# Patient Record
Sex: FEMALE | Race: BLACK OR AFRICAN AMERICAN
[De-identification: names, ages, dates, MRNs, and addresses within clinical notes are randomized per-mention and may not be internally consistent; named-entity substitution may affect disease eponyms.]

---

## 2020-04-16 ENCOUNTER — APPOINTMENT (OUTPATIENT)
Dept: DISASTER EMERGENCY | Facility: CLINIC | Age: 50
End: 2020-04-16
Payer: COMMERCIAL

## 2020-04-16 VITALS
OXYGEN SATURATION: 96 % | RESPIRATION RATE: 20 BRPM | HEART RATE: 88 BPM | TEMPERATURE: 99.1 F | DIASTOLIC BLOOD PRESSURE: 76 MMHG | SYSTOLIC BLOOD PRESSURE: 122 MMHG

## 2020-04-16 DIAGNOSIS — Z20.828 CONTACT WITH AND (SUSPECTED) EXPOSURE TO OTHER VIRAL COMMUNICABLE DISEASES: ICD-10-CM

## 2020-04-16 PROBLEM — Z00.00 ENCOUNTER FOR PREVENTIVE HEALTH EXAMINATION: Status: ACTIVE | Noted: 2020-04-16

## 2020-04-16 PROCEDURE — 99213 OFFICE O/P EST LOW 20 MIN: CPT

## 2020-04-16 NOTE — HISTORY OF PRESENT ILLNESS
[Patient presents to the office today for COVID-19 evaluation and testing.] : Patient presents to the office today for COVID-19 evaluation and testing. [Patient has been pre-screened by RN at call center for appointment today with our facility.] : Patient has been pre-screened by RN at call center for appointment today with our facility. [] : no dizziness on standing [With Confirmed Case] : patient exposed to a confirmed case of COVID-19 [None] : none [Clear] : clear [Good Air Entry] : good air entry [Normal O2 sat at rest] : normal O2 sat at rest [Grossly normal, interacts, not tired or weak] : grossly normal, interacts, not tired or weak [COVID-19 testing ordered and specimen obtained] : COVID-19 testing ordered and specimen obtained [Discharged with current Quarantine instructions and advised of signs of worsening illness.] : Patient discharged with current quarantine instructions and advised of signs of worsening illness. Patient told to seek emergent care if symptoms occur. 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 She c/o fever (max 100:), cough (dry) sob when she cough Body aches, h/a, sore throat,nausea, loss of taste  S&S started a wk ago\par States was exposed to corona virus\par Pt has been isolated at home since\par taking meds to help symptoms\par PMH: DM [TextBox_[id number]] : pt in NAD, discussed po fluid increase, hot tea, rest, atc Tylenol, pt to quarantine for 14 days. If no fever for 3 days and asymptomatic can d/c self quarantine.\par Pt tested today due to S&S, comorbidities and meeting exposure criteria as per [facility] protocol. Results of test will be informed to pt to provided telephone number, consent signed. If symptoms worsen pt to go to closest ED. C/W Tylenol as needed./ no NSAIDs. C/W current medication regimen. written instructions provided., Verbalized understanding of instructions.

## 2020-04-17 LAB — SARS-COV-2 N GENE NPH QL NAA+PROBE: NOT DETECTED

## 2022-04-13 ENCOUNTER — APPOINTMENT (OUTPATIENT)
Dept: ORTHOPEDIC SURGERY | Facility: CLINIC | Age: 52
End: 2022-04-13

## 2022-05-09 ENCOUNTER — APPOINTMENT (OUTPATIENT)
Dept: ORTHOPEDIC SURGERY | Facility: CLINIC | Age: 52
End: 2022-05-09
Payer: MEDICAID

## 2022-05-09 VITALS — WEIGHT: 208 LBS | BODY MASS INDEX: 35.51 KG/M2 | HEIGHT: 64 IN

## 2022-05-09 DIAGNOSIS — E11.9 TYPE 2 DIABETES MELLITUS W/OUT COMPLICATIONS: ICD-10-CM

## 2022-05-09 PROCEDURE — 73562 X-RAY EXAM OF KNEE 3: CPT | Mod: LT

## 2022-05-09 PROCEDURE — 99072 ADDL SUPL MATRL&STAF TM PHE: CPT

## 2022-05-09 PROCEDURE — 99204 OFFICE O/P NEW MOD 45 MIN: CPT

## 2022-05-09 RX ORDER — CANAGLIFLOZIN 300 MG/1
300 TABLET, FILM COATED ORAL
Refills: 0 | Status: ACTIVE | COMMUNITY

## 2022-05-09 RX ORDER — METFORMIN HYDROCHLORIDE 1000 MG/1
1000 TABLET, COATED ORAL
Refills: 0 | Status: ACTIVE | COMMUNITY

## 2022-05-09 RX ORDER — GLIMEPIRIDE 2 MG/1
2 TABLET ORAL
Refills: 0 | Status: ACTIVE | COMMUNITY

## 2022-05-09 RX ORDER — SEMAGLUTIDE 1.34 MG/ML
4 INJECTION, SOLUTION SUBCUTANEOUS
Refills: 0 | Status: ACTIVE | COMMUNITY

## 2022-05-09 NOTE — PHYSICAL EXAM
[Left] : left knee [FreeTextEntry3] : LT Knee- No laxity Tender medial JT line. No significant lateral JT line tenderness. Nadira's is highly positive medially. No PF tenderness. Antalgic gait

## 2022-05-09 NOTE — HISTORY OF PRESENT ILLNESS
[Left Leg] : left leg [Right Leg] : right leg [9] : 9 [Dull/Aching] : dull/aching [Sharp] : sharp [Intermittent] : intermittent [Nothing helps with pain getting better] : Nothing helps with pain getting better [de-identified] : 05/09/2022: Left leg pain which started over 2 months ago. Pt denies any traumatic injury . Pain in concentrated in her knee and ankle. Pt also states her Rt leg has been bothering her since last week. Burning biting felling on the inner lateral RT Left knee. Pt has had many episode of Rob giving way and has a strong apprehension that the knee will give way, to a degree that causes her to walk with a non reciprocal gait, always leading with the left foot. Nadira's  is highly positive with slight medial torsion producing strong lateral and medial pain.  [] : no

## 2022-06-24 ENCOUNTER — APPOINTMENT (OUTPATIENT)
Dept: ORTHOPEDIC SURGERY | Facility: CLINIC | Age: 52
End: 2022-06-24
Payer: MEDICAID

## 2022-06-24 DIAGNOSIS — S83.242A OTHER TEAR OF MEDIAL MENISCUS, CURRENT INJURY, LEFT KNEE, INITIAL ENCOUNTER: ICD-10-CM

## 2022-06-24 DIAGNOSIS — M17.12 UNILATERAL PRIMARY OSTEOARTHRITIS, LEFT KNEE: ICD-10-CM

## 2022-06-24 PROCEDURE — 99213 OFFICE O/P EST LOW 20 MIN: CPT

## 2022-06-24 RX ORDER — DICLOFENAC SODIUM 1% 10 MG/G
1 GEL TOPICAL
Qty: 3 | Refills: 1 | Status: ACTIVE | COMMUNITY
Start: 2022-06-24 | End: 1900-01-01

## 2022-06-27 PROBLEM — M17.12 PRIMARY OSTEOARTHRITIS OF LEFT KNEE: Status: ACTIVE | Noted: 2022-05-09

## 2022-06-27 PROBLEM — S83.242A TEAR OF MEDIAL MENISCUS OF LEFT KNEE, CURRENT, UNSPECIFIED TEAR TYPE, INITIAL ENCOUNTER: Status: ACTIVE | Noted: 2022-05-09

## 2022-06-27 NOTE — HISTORY OF PRESENT ILLNESS
[Left Leg] : left leg [Dull/Aching] : dull/aching [Sharp] : sharp [Intermittent] : intermittent [Nothing helps with pain getting better] : Nothing helps with pain getting better [Gradual] : gradual [8] : 8 [0] : 0 [Meds] : meds [Walking] : walking [Stairs] : stairs [de-identified] : 05/09/2022: Left leg pain which started over 2 months ago. Pt denies any traumatic injury . Pain in concentrated in her knee and ankle. Pt also states her Rt leg has been bothering her since last week. Burning biting felling on the inner lateral RT Left knee. Pt has had many episode of Rob giving way and has a strong apprehension that the knee will give way, to a degree that causes her to walk with a non reciprocal gait, always leading with the left foot. Nadira's  is highly positive with slight medial torsion producing strong lateral and medial pain.\par \par 06/24/2022: a 53 yo female, presents for left knee pain follow up.the pain has been the same since the last visit. the pain is associated with mild swelling but no clicking/stiffness. uses pain cream that confers a temporally relief. [] : no [FreeTextEntry1] : left knee [FreeTextEntry7] : down to the left ankle [FreeTextEntry9] : pain cream [de-identified] : prolonged standing and walking

## 2022-06-27 NOTE — DISCUSSION/SUMMARY
[de-identified] : Alternatives, Risks, Benefits including pre and post-op procedures were discussed today. \par \par A full informed consent discussion was had today.\par

## 2022-06-27 NOTE — PHYSICAL EXAM
[Left] : left knee [FreeTextEntry3] : LT Knee- No laxity Tender medial JT line. No significant lateral JT line tenderness. Nadira's is highly positive medially. No PF tenderness. Antalgic gait full weight-bearing

## 2024-05-31 ENCOUNTER — APPOINTMENT (OUTPATIENT)
Dept: ORTHOPEDIC SURGERY | Facility: CLINIC | Age: 54
End: 2024-05-31
Payer: COMMERCIAL

## 2024-05-31 VITALS — WEIGHT: 202 LBS | BODY MASS INDEX: 34.49 KG/M2 | HEIGHT: 64 IN

## 2024-05-31 DIAGNOSIS — M17.11 UNILATERAL PRIMARY OSTEOARTHRITIS, RIGHT KNEE: ICD-10-CM

## 2024-05-31 DIAGNOSIS — Z78.9 OTHER SPECIFIED HEALTH STATUS: ICD-10-CM

## 2024-05-31 PROCEDURE — 20610 DRAIN/INJ JOINT/BURSA W/O US: CPT | Mod: RT

## 2024-05-31 PROCEDURE — 73562 X-RAY EXAM OF KNEE 3: CPT | Mod: RT

## 2024-05-31 PROCEDURE — 99214 OFFICE O/P EST MOD 30 MIN: CPT | Mod: 25

## 2024-05-31 PROCEDURE — 73590 X-RAY EXAM OF LOWER LEG: CPT | Mod: RT

## 2024-05-31 RX ORDER — METFORMIN HYDROCHLORIDE 1000 MG/1
1000 TABLET, COATED ORAL
Refills: 0 | Status: ACTIVE | COMMUNITY

## 2024-05-31 RX ORDER — GLIMEPIRIDE 4 MG/1
4 TABLET ORAL
Refills: 0 | Status: ACTIVE | COMMUNITY

## 2024-05-31 RX ORDER — PREGABALIN 75 MG/1
75 CAPSULE ORAL
Refills: 0 | Status: ACTIVE | COMMUNITY

## 2024-05-31 NOTE — IMAGING
[de-identified] :   ----------------------------------------------------------------------------   Right knee exam:   Skin: no significant pertinent finding  Inspection:     (neg) Effusion     (neg) Malalignment     (neg) Swelling     (neg) Quad atrophy     (neg) J-sign  ROM:     0 - 125 degrees of flexion.  Tenderness:     (neg) MJLT     (neg) LJLT     (neg) Medial patellar facet tenderness     (+) Lateral patellar facet tenderness     (+) Crepitus     (+) Patellar grind tenderness     (neg) Patellar tendon     (neg) Quad tendon     Other:  Stability:     (neg) Lachman     (neg) Varus/Valgus instability     (neg) Posterior drawer     (neg) Patellar translation: wnl  Additional tests:     (neg) McMurrays test     (neg) Patellar apprehension     Other:  Strength: 5/5 Q/H/TA/GS/EHL  Neuro: In tact to light touch throughout, DTR's wnl  Vascularity: Extremity warm and well perfused  Gait: normal     [Right] : right knee [There are no fractures, subluxations or dislocations. No significant abnormalities are seen] : There are no fractures, subluxations or dislocations. No significant abnormalities are seen [FreeTextEntry9] : mild to moderate PF OA, moderate medial OA

## 2024-05-31 NOTE — DISCUSSION/SUMMARY
[de-identified] : Plan for R knee CSI today Discussed the importance of maintain glycemic control and watching her blood sugar after the injection Plan for PT ----------------------------------------------------------------------------  Patient was advised that steroid medications may result in increased blood sugars, and given patient's history of diabetes, blood sugars should be monitored closely, where applicable, for 5-10 days during and following steroid medication intake orally or through injection.    All relevant imaging studies pertinent to today's visit, including x-rays, MRI's and/or other advanced imaging studies (CT/etc) were independently interpreted and reviewed with the patient as needed. Implications of the studies together with the patient's clinical picture were discussed to formulate a working diagnosis and management options were detailed.   The patient and/or guardian was advised of the diagnosis.  The natural history of the pathology was explained in full. All questions were answered.  The risks and benefits of conservative and interventional treatment alternatives were explained to the patient  The patient and/or guardian was advised if any advanced diagnostic/imaging study (MRI/CT/etc) is ordered to evaluate potential pathology in the affected area(s), they should follow up in the office to review the results of the study and determine further management that may be indicated.     ----------------------------------------------------------------------------  Large joint corticosteroid injection given: Right knee  Patient indicated for injection after trial of rest, OTC medications including aspirin, Ibuprofen, Aleve etc or prescription NSAIDS, and/or exercises at home and/ or physical therapy without satisfactory response.  Patient has symptoms including pain, swelling, and/or decreased mobility in the joint. The risks, benefits, and alternatives to corticosteroid injection were explained in full to the patient, including but not limited to infection, sepsis, bleeding, scarring, skin discoloration, temporary increase in pain, syncopal episode, failure to resolve symptoms, allergic reaction, symptom recurrence, and elevation of blood sugar in diabetics. Patient understood the risks. All questions were answered. After discussion of options, patient requested an injection.   Oral informed consent was obtained and sterile technique was utilized for the procedure including the preparation of the solutions used for the injection and betadine followed by alcohol prep to the injection site. Anesthesia was given with ethyl chloride sprayed topically. The injection was delivered. Patient tolerated the procedure well.   Post Procedure Instructions: Patient was advised to call if redness, pain, or fever occur and apply ice for 15 min on and 15 min off later today  Medications delivered: Kenalog 10 mg, Lidocaine: 4 cc

## 2024-05-31 NOTE — HISTORY OF PRESENT ILLNESS
[Sudden] : sudden [10] : 10 [5] : 5 [Sharp] : sharp [Frequent] : frequent [Nothing helps with pain getting better] : Nothing helps with pain getting better [Standing] : standing [Walking] : walking [de-identified] : This is Ms. KRISTY MASON  a 54 year old female who comes in today complaining of right knee and shin pain for two months with no injury.  She was seeing Dr Arshad for her L knee and had noted similar pain in the R knee    h/o of diabetes last A1c- 7.9 164 [] : no [de-identified] : [place]

## 2024-07-23 ENCOUNTER — APPOINTMENT (OUTPATIENT)
Dept: ORTHOPEDIC SURGERY | Facility: CLINIC | Age: 54
End: 2024-07-23

## 2024-08-20 ENCOUNTER — APPOINTMENT (OUTPATIENT)
Dept: ORTHOPEDIC SURGERY | Facility: CLINIC | Age: 54
End: 2024-08-20
Payer: COMMERCIAL

## 2024-08-20 DIAGNOSIS — M17.11 UNILATERAL PRIMARY OSTEOARTHRITIS, RIGHT KNEE: ICD-10-CM

## 2024-08-20 PROCEDURE — 99213 OFFICE O/P EST LOW 20 MIN: CPT | Mod: 25

## 2024-08-20 PROCEDURE — 20610 DRAIN/INJ JOINT/BURSA W/O US: CPT | Mod: RT

## 2024-08-20 RX ORDER — DICLOFENAC SODIUM 10 MG/G
1 GEL TOPICAL
Qty: 1 | Refills: 1 | Status: ACTIVE | COMMUNITY
Start: 2024-08-20 | End: 1900-01-01

## 2024-08-20 NOTE — DISCUSSION/SUMMARY
[Medication Risks Reviewed] : Medication risks reviewed [de-identified] : Discussed options, Plan for Pes bursa injection voltaren gel f/u 6-8 weeks ----------------------------------------------------------------------------  Large joint corticosteroid injection given: Pes Bursa  Patient indicated for injection after trial of rest, OTC medications including aspirin, Ibuprofen, Aleve etc or prescription NSAIDS, and/or exercises at home and/ or physical therapy without satisfactory response.  Patient has symptoms including pain, swelling, and/or decreased mobility in the joint. The risks, benefits, and alternatives to corticosteroid injection were explained in full to the patient, including but not limited to infection, sepsis, bleeding, scarring, skin discoloration, temporary increase in pain, syncopal episode, failure to resolve symptoms, allergic reaction, symptom recurrence, and elevation of blood sugar in diabetics. Patient understood the risks. All questions were answered. After discussion of options, patient requested an injection.   Oral informed consent was obtained and sterile technique was utilized for the procedure including the preparation of the solutions used for the injection and betadine followed by alcohol prep to the injection site. Anesthesia was given with ethyl chloride sprayed topically. The injection was delivered. Patient tolerated the procedure well.   Post Procedure Instructions: Patient was advised to call if redness, pain, or fever occur and apply ice for 15 min on and 15 min off later today  Medications delivered: Kenalog 10mg, Lidocaine: 2cc  ----------------------------------------------------------------------------  Patient was advised that steroid medications may result in increased blood sugars, and given patient's history of diabetes, blood sugars should be monitored closely, where applicable, for 5-10 days during and following steroid medication intake orally or through injection.    All relevant imaging studies pertinent to today's visit, including x-rays, MRI's and/or other advanced imaging studies (CT/etc) were independently interpreted and reviewed with the patient as needed. Implications of the studies together with the patient's clinical picture were discussed to formulate a working diagnosis and management options were detailed.   The patient and/or guardian was advised of the diagnosis.  The natural history of the pathology was explained in full. All questions were answered.  The risks and benefits of conservative and interventional treatment alternatives were explained to the patient  The patient and/or guardian was advised if any advanced diagnostic/imaging study (MRI/CT/etc) is ordered to evaluate potential pathology in the affected area(s), they should follow up in the office to review the results of the study and determine further management that may be indicated.

## 2024-08-20 NOTE — HISTORY OF PRESENT ILLNESS
[Sudden] : sudden [10] : 10 [5] : 5 [Sharp] : sharp [Frequent] : frequent [Nothing helps with pain getting better] : Nothing helps with pain getting better [Standing] : standing [Walking] : walking [de-identified] : This is Ms. KRISTY MASON  a 54 year old female who comes in today complaining of right knee and shin pain for two months with no injury.  She was seeing Dr Arshad for her L knee and had noted similar pain in the R knee    h/o of diabetes last A1c- 7.9 164 [] : no [de-identified] : [place]

## 2024-08-20 NOTE — REASON FOR VISIT
[FreeTextEntry2] : f/u - right knee/shin, Injection gave relief for maybe a month, was 100% better, however pain has returned and has worsened, sugars were good after the injection

## 2024-08-20 NOTE — IMAGING
[Right] : right knee [There are no fractures, subluxations or dislocations. No significant abnormalities are seen] : There are no fractures, subluxations or dislocations. No significant abnormalities are seen [de-identified] :   ----------------------------------------------------------------------------   Right knee exam:   Skin: no significant pertinent finding  Inspection:     (neg) Effusion     (neg) Malalignment     (neg) Swelling     (neg) Quad atrophy     (neg) J-sign  ROM:     0 - 125 degrees of flexion.  Tenderness:     (neg) MJLT     (neg) LJLT     (neg) Medial patellar facet tenderness     (+) Lateral patellar facet tenderness     (+) Crepitus     (+) Patellar grind tenderness     (neg) Patellar tendon     (neg) Quad tendon     Other:  Stability:     (neg) Lachman     (neg) Varus/Valgus instability     (neg) Posterior drawer     (neg) Patellar translation: wnl  Additional tests:     (neg) McMurrays test     (neg) Patellar apprehension     Other:  Strength: 5/5 Q/H/TA/GS/EHL  Neuro: In tact to light touch throughout, DTR's wnl  Vascularity: Extremity warm and well perfused  Gait: normal     [FreeTextEntry9] : mild to moderate PF OA, moderate medial OA

## 2024-09-09 ENCOUNTER — APPOINTMENT (OUTPATIENT)
Dept: ORTHOPEDIC SURGERY | Facility: CLINIC | Age: 54
End: 2024-09-09
Payer: MEDICAID

## 2024-09-09 VITALS — HEIGHT: 64 IN | BODY MASS INDEX: 34.49 KG/M2 | WEIGHT: 202 LBS

## 2024-09-09 DIAGNOSIS — M25.561 PAIN IN RIGHT KNEE: ICD-10-CM

## 2024-09-09 DIAGNOSIS — M23.91 UNSPECIFIED INTERNAL DERANGEMENT OF RIGHT KNEE: ICD-10-CM

## 2024-09-09 DIAGNOSIS — M17.11 UNILATERAL PRIMARY OSTEOARTHRITIS, RIGHT KNEE: ICD-10-CM

## 2024-09-09 DIAGNOSIS — M17.12 UNILATERAL PRIMARY OSTEOARTHRITIS, LEFT KNEE: ICD-10-CM

## 2024-09-09 PROCEDURE — 99213 OFFICE O/P EST LOW 20 MIN: CPT | Mod: 25

## 2024-09-09 PROCEDURE — 20610 DRAIN/INJ JOINT/BURSA W/O US: CPT | Mod: RT

## 2024-09-09 PROCEDURE — 73562 X-RAY EXAM OF KNEE 3: CPT | Mod: LT

## 2024-09-09 NOTE — DISCUSSION/SUMMARY
[Medication Risks Reviewed] : Medication risks reviewed [de-identified] : Discussed options, MRI denied, will pursue conservative treatments Start Physical Therapy Plan for b/l knee CSI Plan for asp right knee Discussed MRI if pain symptoms persist f/u 6-8 weeks   ----------------------------------------------------------------------------   Joint/cyst aspiration: right knee   Patient indicated for injection after trial of rest, OTC medications including aspirin, Ibuprofen, Aleve etc or prescription NSAIDS, and/or exercises at home and/ or physical therapy without satisfactory response.  Patient has symptoms including pain, swelling, and/or decreased mobility in the joint. The risks, benefits, and alternatives to aspiration or injection were explained in full to the patient, including but not limited to infection, sepsis, bleeding, scarring, skin discoloration, temporary increase in pain, syncopal episode, failure to resolve symptoms, allergic reaction, symptom recurrence, and elevation of blood sugar in diabetics. Patient understood the risks. All questions were answered. After discussion of options, patient requested an procedure   Oral informed consent was obtained and sterile technique was utilized for the procedure including the preparation of the solutions used for the injection and betadine followed by alcohol prep to the injection site. Anesthesia was given with ethyl chloride sprayed topically. The aspiration was performed. Patient tolerated the procedure well.   Post Procedure Instructions: Patient was advised to call if redness, pain, or fever occur and apply ice for 15 min on and 15 min off later today    ----------------------------------------------------------------------------  Large joint corticosteroid injection given: Bilateral knees  Patient indicated for injection after trial of rest, OTC medications including aspirin, Ibuprofen, Aleve etc or prescription NSAIDS, and/or exercises at home and/ or physical therapy without satisfactory response.  Patient has symptoms including pain, swelling, and/or decreased mobility in the joint. The risks, benefits, and alternatives to corticosteroid injection were explained in full to the patient, including but not limited to infection, sepsis, bleeding, scarring, skin discoloration, temporary increase in pain, syncopal episode, failure to resolve symptoms, allergic reaction, symptom recurrence, and elevation of blood sugar in diabetics. Patient understood the risks. All questions were answered. After discussion of options, patient requested an injection.   Oral informed consent was obtained and sterile technique was utilized for the procedure including the preparation of the solutions used for the injection and betadine followed by alcohol prep to the injection site. Anesthesia was given with ethyl chloride sprayed topically. The injection was delivered. Patient tolerated the procedure well.   Post Procedure Instructions: Patient was advised to call if redness, pain, or fever occur and apply ice for 15 min on and 15 min off later today  Medications delivered: Kenalog 10 mg, Lidocaine: 4 cc in each knee      ----------------------------------------------------------------------------   All relevant imaging studies pertinent to today's visit, including x-rays, MRI's and/or other advanced imaging studies (CT/etc) were independently interpreted and reviewed with the patient as needed. Implications of the studies together with the patient's clinical picture were discussed to formulate a working diagnosis and management options were detailed.   The patient and/or guardian was advised of the diagnosis.  The natural history of the pathology was explained in full. All questions were answered.  The risks and benefits of conservative and interventional treatment alternatives were explained to the patient  The patient and/or guardian was advised if any advanced diagnostic/imaging study (MRI/CT/etc) is ordered to evaluate potential pathology in the affected area(s), they should follow up in the office to review the results of the study and determine further management that may be indicated.

## 2024-09-09 NOTE — HISTORY OF PRESENT ILLNESS
[Sudden] : sudden [10] : 10 [5] : 5 [Sharp] : sharp [Frequent] : frequent [Nothing helps with pain getting better] : Nothing helps with pain getting better [Standing] : standing [Walking] : walking [de-identified] : This is Ms. KRISTY MASON  a 54 year old female who comes in today complaining of right knee and shin pain for two months with no injury.  She was seeing Dr Quevedo for her L knee and had noted similar pain in the R knee. Pt complaining of catching and buckling symptoms    h/o of diabetes last A1c- 7.9 164 [] : no [de-identified] : [place]  [de-identified] : none

## 2024-09-09 NOTE — IMAGING
[There are no fractures, subluxations or dislocations. No significant abnormalities are seen] : There are no fractures, subluxations or dislocations. No significant abnormalities are seen [Bilateral] : knee bilaterally [Moderate tricompartmental OA medial narrowing] : Moderate tricompartmental OA medial narrowing [Mild patellofemoral OA] : Mild patellofemoral OA [de-identified] :   ----------------------------------------------------------------------------   b/l knee exam:   Skin: no significant pertinent finding  Inspection:     (mod) Effusion     (neg) Malalignment     (neg) Swelling     (neg) Quad atrophy     (neg) J-sign  ROM:     0 - 95 degrees of flexion.  Tenderness:     (++) MJLT     (neg) LJLT     (neg) Medial patellar facet tenderness     (+) Lateral patellar facet tenderness     (+) Crepitus     (+) Patellar grind tenderness     (neg) Patellar tendon     (neg) Quad tendon     Other:  Stability:     (neg) Lachman     (neg) Varus/Valgus instability     (neg) Posterior drawer     (neg) Patellar translation: wnl  Additional tests:     (+) McMurrays test     (neg) Patellar apprehension     Other:  Strength: 5/5 Q/H/TA/GS/EHL  Neuro: In tact to light touch throughout, DTR's wnl  Vascularity: Extremity warm and well perfused  Gait: antalgic      [FreeTextEntry9] : mild to moderate PF OA, moderate medial OA

## 2024-09-09 NOTE — REASON FOR VISIT
[FreeTextEntry2] : f/u - right knee, Pt received no relief from pes bursa inj, Pt complaining of catching and buckling symptoms

## 2024-09-20 ENCOUNTER — APPOINTMENT (OUTPATIENT)
Dept: ORTHOPEDIC SURGERY | Facility: CLINIC | Age: 54
End: 2024-09-20

## 2024-10-24 ENCOUNTER — APPOINTMENT (OUTPATIENT)
Dept: ORTHOPEDIC SURGERY | Facility: CLINIC | Age: 54
End: 2024-10-24

## 2024-10-31 ENCOUNTER — APPOINTMENT (OUTPATIENT)
Dept: ORTHOPEDIC SURGERY | Facility: CLINIC | Age: 54
End: 2024-10-31
Payer: MEDICAID

## 2024-10-31 VITALS — HEIGHT: 64 IN | BODY MASS INDEX: 34.49 KG/M2 | WEIGHT: 202 LBS

## 2024-10-31 DIAGNOSIS — M25.561 PAIN IN RIGHT KNEE: ICD-10-CM

## 2024-10-31 DIAGNOSIS — M17.11 UNILATERAL PRIMARY OSTEOARTHRITIS, RIGHT KNEE: ICD-10-CM

## 2024-10-31 PROCEDURE — 99214 OFFICE O/P EST MOD 30 MIN: CPT

## 2024-10-31 RX ORDER — NAPROXEN 500 MG/1
500 TABLET ORAL
Qty: 30 | Refills: 0 | Status: ACTIVE | COMMUNITY
Start: 2024-10-31 | End: 1900-01-01

## 2024-11-15 ENCOUNTER — APPOINTMENT (OUTPATIENT)
Dept: ORTHOPEDIC SURGERY | Facility: CLINIC | Age: 54
End: 2024-11-15
Payer: MEDICAID

## 2024-11-15 VITALS — WEIGHT: 202 LBS | BODY MASS INDEX: 34.49 KG/M2 | HEIGHT: 64 IN

## 2024-11-15 DIAGNOSIS — M23.306 OTHER MENISCUS DERANGEMENTS, UNSPECIFIED MENISCUS, RIGHT KNEE: ICD-10-CM

## 2024-11-15 DIAGNOSIS — M17.11 UNILATERAL PRIMARY OSTEOARTHRITIS, RIGHT KNEE: ICD-10-CM

## 2024-11-15 PROCEDURE — 99214 OFFICE O/P EST MOD 30 MIN: CPT

## 2024-12-10 ENCOUNTER — APPOINTMENT (OUTPATIENT)
Dept: ORTHOPEDIC SURGERY | Facility: CLINIC | Age: 54
End: 2024-12-10
Payer: MEDICAID

## 2024-12-10 DIAGNOSIS — M17.11 UNILATERAL PRIMARY OSTEOARTHRITIS, RIGHT KNEE: ICD-10-CM

## 2024-12-10 PROCEDURE — 99215 OFFICE O/P EST HI 40 MIN: CPT

## 2025-02-11 ENCOUNTER — APPOINTMENT (OUTPATIENT)
Dept: ORTHOPEDIC SURGERY | Facility: CLINIC | Age: 55
End: 2025-02-11
Payer: MEDICAID

## 2025-02-11 DIAGNOSIS — M17.11 UNILATERAL PRIMARY OSTEOARTHRITIS, RIGHT KNEE: ICD-10-CM

## 2025-02-11 PROCEDURE — 99215 OFFICE O/P EST HI 40 MIN: CPT

## 2025-02-14 ENCOUNTER — NON-APPOINTMENT (OUTPATIENT)
Age: 55
End: 2025-02-14

## 2025-03-11 ENCOUNTER — OUTPATIENT (OUTPATIENT)
Dept: OUTPATIENT SERVICES | Facility: HOSPITAL | Age: 55
LOS: 1 days | Discharge: HOME | End: 2025-03-11
Payer: MEDICAID

## 2025-03-11 VITALS
HEIGHT: 64 IN | DIASTOLIC BLOOD PRESSURE: 71 MMHG | OXYGEN SATURATION: 95 % | HEART RATE: 89 BPM | WEIGHT: 194.01 LBS | RESPIRATION RATE: 18 BRPM | TEMPERATURE: 98 F | SYSTOLIC BLOOD PRESSURE: 133 MMHG

## 2025-03-11 DIAGNOSIS — E11.9 TYPE 2 DIABETES MELLITUS WITHOUT COMPLICATIONS: ICD-10-CM

## 2025-03-11 DIAGNOSIS — Z01.818 ENCOUNTER FOR OTHER PREPROCEDURAL EXAMINATION: ICD-10-CM

## 2025-03-11 DIAGNOSIS — M17.11 UNILATERAL PRIMARY OSTEOARTHRITIS, RIGHT KNEE: ICD-10-CM

## 2025-03-11 DIAGNOSIS — Z98.890 OTHER SPECIFIED POSTPROCEDURAL STATES: Chronic | ICD-10-CM

## 2025-03-11 LAB
A1C WITH ESTIMATED AVERAGE GLUCOSE RESULT: 7.8 % — HIGH (ref 4–5.6)
ALBUMIN SERPL ELPH-MCNC: 3.8 G/DL — SIGNIFICANT CHANGE UP (ref 3.3–5)
ALP SERPL-CCNC: 121 U/L — HIGH (ref 40–120)
ALT FLD-CCNC: 30 U/L — SIGNIFICANT CHANGE UP (ref 12–78)
ANION GAP SERPL CALC-SCNC: 8 MMOL/L — SIGNIFICANT CHANGE UP (ref 5–17)
APTT BLD: 31.2 SEC — SIGNIFICANT CHANGE UP (ref 24.5–35.6)
AST SERPL-CCNC: 16 U/L — SIGNIFICANT CHANGE UP (ref 15–37)
BASOPHILS # BLD AUTO: 0.02 K/UL — SIGNIFICANT CHANGE UP (ref 0–0.2)
BASOPHILS NFR BLD AUTO: 0.4 % — SIGNIFICANT CHANGE UP (ref 0–2)
BILIRUB SERPL-MCNC: 0.3 MG/DL — SIGNIFICANT CHANGE UP (ref 0.2–1.2)
BLD GP AB SCN SERPL QL: SIGNIFICANT CHANGE UP
BUN SERPL-MCNC: 18 MG/DL — SIGNIFICANT CHANGE UP (ref 7–23)
CALCIUM SERPL-MCNC: 9.7 MG/DL — SIGNIFICANT CHANGE UP (ref 8.5–10.1)
CHLORIDE SERPL-SCNC: 107 MMOL/L — SIGNIFICANT CHANGE UP (ref 96–108)
CO2 SERPL-SCNC: 24 MMOL/L — SIGNIFICANT CHANGE UP (ref 22–31)
CREAT SERPL-MCNC: 0.57 MG/DL — SIGNIFICANT CHANGE UP (ref 0.5–1.3)
EGFR: 107 ML/MIN/1.73M2 — SIGNIFICANT CHANGE UP
EGFR: 107 ML/MIN/1.73M2 — SIGNIFICANT CHANGE UP
EOSINOPHIL # BLD AUTO: 0.11 K/UL — SIGNIFICANT CHANGE UP (ref 0–0.5)
EOSINOPHIL NFR BLD AUTO: 2.2 % — SIGNIFICANT CHANGE UP (ref 0–6)
ESTIMATED AVERAGE GLUCOSE: 177 MG/DL — HIGH (ref 68–114)
GLUCOSE SERPL-MCNC: 154 MG/DL — HIGH (ref 70–99)
HCT VFR BLD CALC: 40.6 % — SIGNIFICANT CHANGE UP (ref 34.5–45)
HGB BLD-MCNC: 13.2 G/DL — SIGNIFICANT CHANGE UP (ref 11.5–15.5)
IMM GRANULOCYTES NFR BLD AUTO: 0.2 % — SIGNIFICANT CHANGE UP (ref 0–0.9)
INR BLD: 0.93 RATIO — SIGNIFICANT CHANGE UP (ref 0.85–1.16)
LYMPHOCYTES # BLD AUTO: 2.15 K/UL — SIGNIFICANT CHANGE UP (ref 1–3.3)
LYMPHOCYTES # BLD AUTO: 42.5 % — SIGNIFICANT CHANGE UP (ref 13–44)
MCHC RBC-ENTMCNC: 30.3 PG — SIGNIFICANT CHANGE UP (ref 27–34)
MCHC RBC-ENTMCNC: 32.5 G/DL — SIGNIFICANT CHANGE UP (ref 32–36)
MCV RBC AUTO: 93.1 FL — SIGNIFICANT CHANGE UP (ref 80–100)
MONOCYTES # BLD AUTO: 0.36 K/UL — SIGNIFICANT CHANGE UP (ref 0–0.9)
MONOCYTES NFR BLD AUTO: 7.1 % — SIGNIFICANT CHANGE UP (ref 2–14)
MRSA PCR RESULT.: SIGNIFICANT CHANGE UP
NEUTROPHILS # BLD AUTO: 2.41 K/UL — SIGNIFICANT CHANGE UP (ref 1.8–7.4)
NEUTROPHILS NFR BLD AUTO: 47.6 % — SIGNIFICANT CHANGE UP (ref 43–77)
NRBC BLD AUTO-RTO: 0 /100 WBCS — SIGNIFICANT CHANGE UP (ref 0–0)
PLATELET # BLD AUTO: 280 K/UL — SIGNIFICANT CHANGE UP (ref 150–400)
POTASSIUM SERPL-MCNC: 3.9 MMOL/L — SIGNIFICANT CHANGE UP (ref 3.5–5.3)
POTASSIUM SERPL-SCNC: 3.9 MMOL/L — SIGNIFICANT CHANGE UP (ref 3.5–5.3)
PROT SERPL-MCNC: 8 GM/DL — SIGNIFICANT CHANGE UP (ref 6–8.3)
PROTHROM AB SERPL-ACNC: 10.8 SEC — SIGNIFICANT CHANGE UP (ref 9.9–13.4)
RBC # BLD: 4.36 M/UL — SIGNIFICANT CHANGE UP (ref 3.8–5.2)
RBC # FLD: 12.7 % — SIGNIFICANT CHANGE UP (ref 10.3–14.5)
S AUREUS DNA NOSE QL NAA+PROBE: SIGNIFICANT CHANGE UP
SODIUM SERPL-SCNC: 139 MMOL/L — SIGNIFICANT CHANGE UP (ref 135–145)
VIT D25+D1,25 OH+D1,25 PNL SERPL-MCNC: 72.1 PG/ML — SIGNIFICANT CHANGE UP (ref 19.9–79.3)
WBC # BLD: 5.06 K/UL — SIGNIFICANT CHANGE UP (ref 3.8–10.5)
WBC # FLD AUTO: 5.06 K/UL — SIGNIFICANT CHANGE UP (ref 3.8–10.5)

## 2025-03-11 PROCEDURE — 93010 ELECTROCARDIOGRAM REPORT: CPT

## 2025-03-11 NOTE — OCCUPATIONAL THERAPY INITIAL EVALUATION ADULT - PERTINENT HX OF CURRENT PROBLEM, REHAB EVAL
R knee OA which impacts pts ability to perform functional tasks/transfers and mobility. Pt is scheduled for R TKR on 3/28/25.

## 2025-03-11 NOTE — H&P PST ADULT - HISTORY OF PRESENT ILLNESS
54F PMH DM presents to PST for scheduled robotic assisted right total knee arthroplasty with [place] on 3/28/25 with      goal: to walk without pain

## 2025-03-11 NOTE — OCCUPATIONAL THERAPY INITIAL EVALUATION ADULT - LUE MMT, REHAB EVAL
[Normal] : mucosa is normal [Midline] : trachea located in midline position Grossly./no strength deficits were identified

## 2025-03-11 NOTE — H&P PST ADULT - ASSESSMENT
54F PMH DM presents to PST for scheduled robotic assisted right total knee arthroplasty with [place] on 3/28/25 with Dr.Leung CAPRINI SCORE    AGE RELATED RISK FACTORS                                                             [ x] Age 41-60 years                                            (1 Point)  [ ] Age: 61-74 years                                           (2 Points)                 [ ] Age= 75 years                                                (3 Points)             DISEASE RELATED RISK FACTORS                                                       [ ] Edema in the lower extremities                 (1 Point)                     [ ] Varicose veins                                               (1 Point)                                 [x ] BMI > 25 Kg/m2                                            (1 Point)                                  [ ] Serious infection (ie PNA)                            (1 Point)                     [ ] Lung disease ( COPD, Emphysema)            (1 Point)                                                                          [ ] Acute myocardial infarction                         (1 Point)                  [ ] Congestive heart failure (in the previous month)  (1 Point)         [ ] Inflammatory bowel disease                            (1 Point)                  [ ] Central venous access, PICC or Port               (2 points)       (within the last month)                                                                [ ] Stroke (in the previous month)                        (5 Points)    [ ] Previous or present malignancy                       (2 points)                                                                                                                                                         HEMATOLOGY RELATED FACTORS                                                         [ ] Prior episodes of VTE                                     (3 Points)                     [ ] Positive family history for VTE                      (3 Points)                  [ ] Prothrombin 00169 A                                     (3 Points)                     [ ] Factor V Leiden                                                (3 Points)                        [ ] Lupus anticoagulants                                      (3 Points)                                                           [ ] Anticardiolipin antibodies                              (3 Points)                                                       [ ] High homocysteine in the blood                   (3 Points)                                             [ ] Other congenital or acquired thrombophilia      (3 Points)                                                [ ] Heparin induced thrombocytopenia                  (3 Points)                                        MOBILITY RELATED FACTORS  [ ] Bed rest                                                         (1 Point)  [ ] Plaster cast                                                    (2 points)  [ ] Bed bound for more than 72 hours           (2 Points)    GENDER SPECIFIC FACTORS  [ ] Pregnancy or had a baby within the last month   (1 Point)  [ ] Post-partum < 6 weeks                                   (1 Point)  [ ] Hormonal therapy  or oral contraception   (1 Point)  [ ] History of pregnancy complications              (1 point)  [ ] Unexplained or recurrent              (1 Point)    OTHER RISK FACTORS                                           (1 Point)  [ ] BMI >40, smoking, diabetes requiring insulin, chemotherapy  blood transfusions and length of surgery over 2 hours    SURGERY RELATED RISK FACTORS  [ ]  Section within the last month     (1 Point)  [ ] Minor surgery                                                  (1 Point)  [ ] Arthroscopic surgery                                       (2 Points)  [ ] Planned major surgery lasting more            (2 Points)      than 45 minutes     [x ] Elective hip or knee joint replacement       (5 points)       surgery                                                TRAUMA RELATED RISK FACTORS  [ ] Fracture of the hip, pelvis, or leg                       (5 Points)  [ ] Spinal cord injury resulting in paralysis             (5 points)       (in the previous month)    [ ] Paralysis  (less than 1 month)                             (5 Points)  [ ] Multiple Trauma within 1 month                        (5 Points)    Total Score [   7     ]    Caprini Score 0-2: Low Risk, NO VTE prophylaxis required for most patients, encourage ambulation  Caprini Score 3-6: Moderate Risk , pharmacologic VTE prophylaxis is indicated for most patients (in the absence of contraindications)  Caprini Score Greater than or =7: High risk, pharmocologic VTE prophylaxis indicated for most patients (in the absence of contraindications)

## 2025-03-11 NOTE — H&P PST ADULT - PROBLEM SELECTOR PLAN 1
Labs-CBC, BMP, PT/INR, PTT ,T&S,HgA1C, Nose Cx, EKG   M/C required    Preop Hibiclens x 3 day instructions reviewed and given. Instructed on if Cx is positive use Mupirocin 5 days and checklist given in booklet   Take routine meds DOS with small sips of water, avoid NSAIDs and OTC supplements, verbalized understanding information on proper nutrition, increase protein and better food choices provided in booklet.    Ensure clear not given 2/2 hx DM  Anesthesiologist to review PST labs, EKG, required clearances, and optimization for surgery

## 2025-03-11 NOTE — H&P PST ADULT - NSICDXPASTSURGICALHX_GEN_ALL_CORE_FT
PAST SURGICAL HISTORY:  No significant past surgical history      PAST SURGICAL HISTORY:  History of cone biopsy of uterine cervix

## 2025-03-11 NOTE — OCCUPATIONAL THERAPY INITIAL EVALUATION ADULT - ADDITIONAL COMMENTS
Pt lives with daughter and her family (Who can assist post op) in a private house with 3 steps with no rails. Once inside, the pt has 12 steps with a L handrail down to where the bedroom and bathroom is. The pts bathroom has a walk in shower stall, fixed/retractable shower head, standard toilet seat and no grab bars. The pt ambulates with no device and owns a pair of axillary crutches. The pt daily pain is a 5/10 at rest and a 8-9/10 with movement. The pt manages the pain with rest, Tylenol and Gabapentin. The pt recently had outpatient PT, no recent falls and has no buckling of the knees. The pt wears glasses all the time, R handed, drives and has no hearing impairments.

## 2025-03-14 ENCOUNTER — NON-APPOINTMENT (OUTPATIENT)
Age: 55
End: 2025-03-14

## 2025-03-27 RX ORDER — MELATONIN 5 MG
3 TABLET ORAL AT BEDTIME
Refills: 0 | Status: DISCONTINUED | OUTPATIENT
Start: 2025-03-28 | End: 2025-03-30

## 2025-03-27 RX ORDER — INSULIN LISPRO 100 U/ML
INJECTION, SOLUTION INTRAVENOUS; SUBCUTANEOUS AT BEDTIME
Refills: 0 | Status: DISCONTINUED | OUTPATIENT
Start: 2025-03-28 | End: 2025-03-30

## 2025-03-27 RX ORDER — SODIUM CHLORIDE 9 G/1000ML
1000 INJECTION, SOLUTION INTRAVENOUS
Refills: 0 | Status: DISCONTINUED | OUTPATIENT
Start: 2025-03-28 | End: 2025-03-30

## 2025-03-27 RX ORDER — ASPIRIN 325 MG
81 TABLET ORAL
Refills: 0 | Status: DISCONTINUED | OUTPATIENT
Start: 2025-03-29 | End: 2025-03-30

## 2025-03-27 RX ORDER — CELECOXIB 50 MG/1
200 CAPSULE ORAL EVERY 12 HOURS
Refills: 0 | Status: DISCONTINUED | OUTPATIENT
Start: 2025-03-29 | End: 2025-03-30

## 2025-03-27 RX ORDER — GLUCAGON 3 MG/1
1 POWDER NASAL ONCE
Refills: 0 | Status: DISCONTINUED | OUTPATIENT
Start: 2025-03-28 | End: 2025-03-30

## 2025-03-27 RX ORDER — B1/B2/B3/B5/B6/B12/VIT C/FOLIC 500-0.5 MG
1 TABLET ORAL DAILY
Refills: 0 | Status: DISCONTINUED | OUTPATIENT
Start: 2025-03-28 | End: 2025-03-30

## 2025-03-27 RX ORDER — ONDANSETRON HCL/PF 4 MG/2 ML
4 VIAL (ML) INJECTION EVERY 6 HOURS
Refills: 0 | Status: DISCONTINUED | OUTPATIENT
Start: 2025-03-28 | End: 2025-03-30

## 2025-03-27 RX ORDER — ACETAMINOPHEN 500 MG/5ML
1000 LIQUID (ML) ORAL EVERY 8 HOURS
Refills: 0 | Status: DISCONTINUED | OUTPATIENT
Start: 2025-03-28 | End: 2025-03-30

## 2025-03-27 RX ORDER — POLYETHYLENE GLYCOL 3350 17 G/17G
17 POWDER, FOR SOLUTION ORAL AT BEDTIME
Refills: 0 | Status: DISCONTINUED | OUTPATIENT
Start: 2025-03-28 | End: 2025-03-30

## 2025-03-27 RX ORDER — DEXTROSE 50 % IN WATER 50 %
25 SYRINGE (ML) INTRAVENOUS ONCE
Refills: 0 | Status: DISCONTINUED | OUTPATIENT
Start: 2025-03-28 | End: 2025-03-30

## 2025-03-27 RX ORDER — DEXTROSE 50 % IN WATER 50 %
12.5 SYRINGE (ML) INTRAVENOUS ONCE
Refills: 0 | Status: DISCONTINUED | OUTPATIENT
Start: 2025-03-28 | End: 2025-03-30

## 2025-03-27 RX ORDER — OXYCODONE HYDROCHLORIDE 30 MG/1
10 TABLET ORAL
Refills: 0 | Status: DISCONTINUED | OUTPATIENT
Start: 2025-03-28 | End: 2025-03-30

## 2025-03-27 RX ORDER — INSULIN LISPRO 100 U/ML
INJECTION, SOLUTION INTRAVENOUS; SUBCUTANEOUS
Refills: 0 | Status: DISCONTINUED | OUTPATIENT
Start: 2025-03-28 | End: 2025-03-30

## 2025-03-27 RX ORDER — ACETAMINOPHEN 500 MG/5ML
1000 LIQUID (ML) ORAL ONCE
Refills: 0 | Status: DISCONTINUED | OUTPATIENT
Start: 2025-03-28 | End: 2025-03-30

## 2025-03-27 RX ORDER — OXYCODONE HYDROCHLORIDE 30 MG/1
5 TABLET ORAL
Refills: 0 | Status: DISCONTINUED | OUTPATIENT
Start: 2025-03-28 | End: 2025-03-30

## 2025-03-27 RX ORDER — GABAPENTIN 400 MG/1
300 CAPSULE ORAL EVERY 12 HOURS
Refills: 0 | Status: DISCONTINUED | OUTPATIENT
Start: 2025-03-28 | End: 2025-03-30

## 2025-03-27 RX ORDER — BISACODYL 5 MG
10 TABLET, DELAYED RELEASE (ENTERIC COATED) ORAL ONCE
Refills: 0 | Status: DISCONTINUED | OUTPATIENT
Start: 2025-03-30 | End: 2025-03-30

## 2025-03-27 RX ORDER — MAGNESIUM HYDROXIDE 400 MG/5ML
30 SUSPENSION ORAL DAILY
Refills: 0 | Status: DISCONTINUED | OUTPATIENT
Start: 2025-03-28 | End: 2025-03-30

## 2025-03-27 RX ORDER — SENNA 187 MG
2 TABLET ORAL AT BEDTIME
Refills: 0 | Status: DISCONTINUED | OUTPATIENT
Start: 2025-03-28 | End: 2025-03-30

## 2025-03-27 RX ORDER — HYDROMORPHONE/SOD CHLOR,ISO/PF 2 MG/10 ML
0.5 SYRINGE (ML) INJECTION
Refills: 0 | Status: DISCONTINUED | OUTPATIENT
Start: 2025-03-28 | End: 2025-03-30

## 2025-03-27 RX ORDER — DEXTROSE 50 % IN WATER 50 %
15 SYRINGE (ML) INTRAVENOUS ONCE
Refills: 0 | Status: DISCONTINUED | OUTPATIENT
Start: 2025-03-28 | End: 2025-03-30

## 2025-03-28 ENCOUNTER — TRANSCRIPTION ENCOUNTER (OUTPATIENT)
Age: 55
End: 2025-03-28

## 2025-03-28 ENCOUNTER — INPATIENT (INPATIENT)
Facility: HOSPITAL | Age: 55
LOS: 1 days | Discharge: HOME HEALTH | End: 2025-03-30
Attending: ORTHOPAEDIC SURGERY | Admitting: ORTHOPAEDIC SURGERY
Payer: MEDICAID

## 2025-03-28 ENCOUNTER — APPOINTMENT (OUTPATIENT)
Dept: ORTHOPEDIC SURGERY | Facility: HOSPITAL | Age: 55
End: 2025-03-28
Payer: MEDICAID

## 2025-03-28 VITALS
RESPIRATION RATE: 16 BRPM | HEART RATE: 91 BPM | HEIGHT: 64 IN | OXYGEN SATURATION: 100 % | SYSTOLIC BLOOD PRESSURE: 124 MMHG | WEIGHT: 194.01 LBS | DIASTOLIC BLOOD PRESSURE: 78 MMHG | TEMPERATURE: 98 F

## 2025-03-28 DIAGNOSIS — Z98.890 OTHER SPECIFIED POSTPROCEDURAL STATES: Chronic | ICD-10-CM

## 2025-03-28 LAB
ANION GAP SERPL CALC-SCNC: 5 MMOL/L — SIGNIFICANT CHANGE UP (ref 5–17)
BUN SERPL-MCNC: 18 MG/DL — SIGNIFICANT CHANGE UP (ref 7–23)
CALCIUM SERPL-MCNC: 9 MG/DL — SIGNIFICANT CHANGE UP (ref 8.5–10.1)
CHLORIDE SERPL-SCNC: 109 MMOL/L — HIGH (ref 96–108)
CO2 SERPL-SCNC: 28 MMOL/L — SIGNIFICANT CHANGE UP (ref 22–31)
CREAT SERPL-MCNC: 0.62 MG/DL — SIGNIFICANT CHANGE UP (ref 0.5–1.3)
EGFR: 104 ML/MIN/1.73M2 — SIGNIFICANT CHANGE UP
EGFR: 104 ML/MIN/1.73M2 — SIGNIFICANT CHANGE UP
GLUCOSE BLDC GLUCOMTR-MCNC: 154 MG/DL — HIGH (ref 70–99)
GLUCOSE BLDC GLUCOMTR-MCNC: 164 MG/DL — HIGH (ref 70–99)
GLUCOSE BLDC GLUCOMTR-MCNC: 168 MG/DL — HIGH (ref 70–99)
GLUCOSE BLDC GLUCOMTR-MCNC: 181 MG/DL — HIGH (ref 70–99)
GLUCOSE BLDC GLUCOMTR-MCNC: 236 MG/DL — HIGH (ref 70–99)
GLUCOSE SERPL-MCNC: 177 MG/DL — HIGH (ref 70–99)
HCT VFR BLD CALC: 37.5 % — SIGNIFICANT CHANGE UP (ref 34.5–45)
HGB BLD-MCNC: 12.1 G/DL — SIGNIFICANT CHANGE UP (ref 11.5–15.5)
MCHC RBC-ENTMCNC: 30.2 PG — SIGNIFICANT CHANGE UP (ref 27–34)
MCHC RBC-ENTMCNC: 32.3 G/DL — SIGNIFICANT CHANGE UP (ref 32–36)
MCV RBC AUTO: 93.5 FL — SIGNIFICANT CHANGE UP (ref 80–100)
NRBC BLD AUTO-RTO: 0 /100 WBCS — SIGNIFICANT CHANGE UP (ref 0–0)
PLATELET # BLD AUTO: 216 K/UL — SIGNIFICANT CHANGE UP (ref 150–400)
POTASSIUM SERPL-MCNC: 3.9 MMOL/L — SIGNIFICANT CHANGE UP (ref 3.5–5.3)
POTASSIUM SERPL-SCNC: 3.9 MMOL/L — SIGNIFICANT CHANGE UP (ref 3.5–5.3)
RBC # BLD: 4.01 M/UL — SIGNIFICANT CHANGE UP (ref 3.8–5.2)
RBC # FLD: 12.9 % — SIGNIFICANT CHANGE UP (ref 10.3–14.5)
SODIUM SERPL-SCNC: 142 MMOL/L — SIGNIFICANT CHANGE UP (ref 135–145)
WBC # BLD: 5.2 K/UL — SIGNIFICANT CHANGE UP (ref 3.8–10.5)
WBC # FLD AUTO: 5.2 K/UL — SIGNIFICANT CHANGE UP (ref 3.8–10.5)

## 2025-03-28 PROCEDURE — 20985 CPTR-ASST DIR MS PX: CPT

## 2025-03-28 PROCEDURE — 73560 X-RAY EXAM OF KNEE 1 OR 2: CPT | Mod: 26,RT

## 2025-03-28 PROCEDURE — 27447 TOTAL KNEE ARTHROPLASTY: CPT | Mod: AS,RT

## 2025-03-28 PROCEDURE — 27447 TOTAL KNEE ARTHROPLASTY: CPT | Mod: RT

## 2025-03-28 DEVICE — INSERT TIB BEARING TRIATHLON CS X3 SZ 3 9MM: Type: IMPLANTABLE DEVICE | Site: RIGHT | Status: FUNCTIONAL

## 2025-03-28 DEVICE — COMP FEM CR CMNTLSS BEADED W/ PA SZ 2 RT: Type: IMPLANTABLE DEVICE | Site: RIGHT | Status: FUNCTIONAL

## 2025-03-28 DEVICE — TRIATHLON TIBIAL COMP SZ 3: Type: IMPLANTABLE DEVICE | Site: RIGHT | Status: FUNCTIONAL

## 2025-03-28 DEVICE — MAKO BONE PIN 3.2MM X 140MM: Type: IMPLANTABLE DEVICE | Site: RIGHT | Status: FUNCTIONAL

## 2025-03-28 DEVICE — MAKO BONE PIN 3.2MM X 110MM: Type: IMPLANTABLE DEVICE | Site: RIGHT | Status: FUNCTIONAL

## 2025-03-28 DEVICE — PATELLA ASYMM TRIATHLON SZ A 32X10MM: Type: IMPLANTABLE DEVICE | Site: RIGHT | Status: FUNCTIONAL

## 2025-03-28 RX ORDER — B1/B2/B3/B5/B6/B12/VIT C/FOLIC 500-0.5 MG
1 TABLET ORAL
Qty: 0 | Refills: 0 | DISCHARGE
Start: 2025-03-28

## 2025-03-28 RX ORDER — SENNA 187 MG
2 TABLET ORAL
Qty: 0 | Refills: 0 | DISCHARGE
Start: 2025-03-28

## 2025-03-28 RX ORDER — ASPIRIN 325 MG
1 TABLET ORAL
Qty: 60 | Refills: 0
Start: 2025-03-28 | End: 2025-04-26

## 2025-03-28 RX ORDER — ONDANSETRON HCL/PF 4 MG/2 ML
1 VIAL (ML) INJECTION
Qty: 28 | Refills: 0
Start: 2025-03-28 | End: 2025-04-03

## 2025-03-28 RX ORDER — CELECOXIB 50 MG/1
200 CAPSULE ORAL ONCE
Refills: 0 | Status: COMPLETED | OUTPATIENT
Start: 2025-03-28 | End: 2025-03-28

## 2025-03-28 RX ORDER — ONDANSETRON HCL/PF 4 MG/2 ML
4 VIAL (ML) INJECTION ONCE
Refills: 0 | Status: DISCONTINUED | OUTPATIENT
Start: 2025-03-28 | End: 2025-03-28

## 2025-03-28 RX ORDER — OXYCODONE HYDROCHLORIDE 30 MG/1
1 TABLET ORAL
Qty: 42 | Refills: 0
Start: 2025-03-28 | End: 2025-04-03

## 2025-03-28 RX ORDER — CEFAZOLIN SODIUM IN 0.9 % NACL 3 G/100 ML
2000 INTRAVENOUS SOLUTION, PIGGYBACK (ML) INTRAVENOUS EVERY 8 HOURS
Refills: 0 | Status: COMPLETED | OUTPATIENT
Start: 2025-03-28 | End: 2025-03-28

## 2025-03-28 RX ORDER — ACETAMINOPHEN 500 MG/5ML
650 LIQUID (ML) ORAL ONCE
Refills: 0 | Status: DISCONTINUED | OUTPATIENT
Start: 2025-03-28 | End: 2025-03-28

## 2025-03-28 RX ORDER — ACETAMINOPHEN 500 MG/5ML
2 LIQUID (ML) ORAL
Qty: 0 | Refills: 0 | DISCHARGE
Start: 2025-03-28

## 2025-03-28 RX ORDER — CELECOXIB 50 MG/1
1 CAPSULE ORAL
Qty: 60 | Refills: 0
Start: 2025-03-28 | End: 2025-04-26

## 2025-03-28 RX ORDER — NALOXONE HYDROCHLORIDE 0.4 MG/ML
4 INJECTION, SOLUTION INTRAMUSCULAR; INTRAVENOUS; SUBCUTANEOUS
Qty: 1 | Refills: 0
Start: 2025-03-28 | End: 2025-03-28

## 2025-03-28 RX ORDER — GABAPENTIN 400 MG/1
1 CAPSULE ORAL
Qty: 0 | Refills: 0 | DISCHARGE
Start: 2025-03-28

## 2025-03-28 RX ORDER — GABAPENTIN 400 MG/1
0 CAPSULE ORAL
Refills: 0 | DISCHARGE

## 2025-03-28 RX ORDER — CYCLOBENZAPRINE HYDROCHLORIDE 15 MG/1
1 CAPSULE, EXTENDED RELEASE ORAL
Qty: 9 | Refills: 0
Start: 2025-03-28 | End: 2025-03-30

## 2025-03-28 RX ORDER — KETOROLAC TROMETHAMINE 30 MG/ML
15 INJECTION, SOLUTION INTRAMUSCULAR; INTRAVENOUS EVERY 6 HOURS
Refills: 0 | Status: DISCONTINUED | OUTPATIENT
Start: 2025-03-28 | End: 2025-03-29

## 2025-03-28 RX ORDER — HYDROMORPHONE/SOD CHLOR,ISO/PF 2 MG/10 ML
0.5 SYRINGE (ML) INJECTION
Refills: 0 | Status: DISCONTINUED | OUTPATIENT
Start: 2025-03-28 | End: 2025-03-28

## 2025-03-28 RX ORDER — DEXAMETHASONE 0.5 MG/1
8 TABLET ORAL ONCE
Refills: 0 | Status: COMPLETED | OUTPATIENT
Start: 2025-03-29 | End: 2025-03-29

## 2025-03-28 RX ORDER — SODIUM CHLORIDE 9 G/1000ML
1000 INJECTION, SOLUTION INTRAVENOUS
Refills: 0 | Status: DISCONTINUED | OUTPATIENT
Start: 2025-03-28 | End: 2025-03-29

## 2025-03-28 RX ORDER — FENTANYL CITRATE-0.9 % NACL/PF 100MCG/2ML
25 SYRINGE (ML) INTRAVENOUS
Refills: 0 | Status: DISCONTINUED | OUTPATIENT
Start: 2025-03-28 | End: 2025-03-30

## 2025-03-28 RX ORDER — SODIUM CHLORIDE 9 G/1000ML
1000 INJECTION, SOLUTION INTRAVENOUS
Refills: 0 | Status: DISCONTINUED | OUTPATIENT
Start: 2025-03-28 | End: 2025-03-28

## 2025-03-28 RX ADMIN — SODIUM CHLORIDE 125 MILLILITER(S): 9 INJECTION, SOLUTION INTRAVENOUS at 12:58

## 2025-03-28 RX ADMIN — KETOROLAC TROMETHAMINE 15 MILLIGRAM(S): 30 INJECTION, SOLUTION INTRAMUSCULAR; INTRAVENOUS at 13:49

## 2025-03-28 RX ADMIN — Medication 500 MILLIGRAM(S): at 17:17

## 2025-03-28 RX ADMIN — GABAPENTIN 300 MILLIGRAM(S): 400 CAPSULE ORAL at 17:18

## 2025-03-28 RX ADMIN — INSULIN LISPRO 1: 100 INJECTION, SOLUTION INTRAVENOUS; SUBCUTANEOUS at 16:14

## 2025-03-28 RX ADMIN — Medication 2 TABLET(S): at 21:26

## 2025-03-28 RX ADMIN — KETOROLAC TROMETHAMINE 15 MILLIGRAM(S): 30 INJECTION, SOLUTION INTRAMUSCULAR; INTRAVENOUS at 18:03

## 2025-03-28 RX ADMIN — CELECOXIB 200 MILLIGRAM(S): 50 CAPSULE ORAL at 06:30

## 2025-03-28 RX ADMIN — INSULIN LISPRO 1: 100 INJECTION, SOLUTION INTRAVENOUS; SUBCUTANEOUS at 12:49

## 2025-03-28 RX ADMIN — KETOROLAC TROMETHAMINE 15 MILLIGRAM(S): 30 INJECTION, SOLUTION INTRAMUSCULAR; INTRAVENOUS at 17:17

## 2025-03-28 RX ADMIN — POLYETHYLENE GLYCOL 3350 17 GRAM(S): 17 POWDER, FOR SOLUTION ORAL at 21:26

## 2025-03-28 RX ADMIN — Medication 100 MILLIGRAM(S): at 16:20

## 2025-03-28 RX ADMIN — OXYCODONE HYDROCHLORIDE 10 MILLIGRAM(S): 30 TABLET ORAL at 18:03

## 2025-03-28 RX ADMIN — SODIUM CHLORIDE 125 MILLILITER(S): 9 INJECTION, SOLUTION INTRAVENOUS at 21:26

## 2025-03-28 RX ADMIN — OXYCODONE HYDROCHLORIDE 10 MILLIGRAM(S): 30 TABLET ORAL at 17:17

## 2025-03-28 RX ADMIN — KETOROLAC TROMETHAMINE 15 MILLIGRAM(S): 30 INJECTION, SOLUTION INTRAMUSCULAR; INTRAVENOUS at 12:51

## 2025-03-28 RX ADMIN — SODIUM CHLORIDE 100 MILLILITER(S): 9 INJECTION, SOLUTION INTRAVENOUS at 09:45

## 2025-03-28 RX ADMIN — Medication 1 TABLET(S): at 12:52

## 2025-03-28 NOTE — PROGRESS NOTE ADULT - SUBJECTIVE AND OBJECTIVE BOX
Post op Note  Patient is s/p R TKA under spinal anesthesia. Pt tolerated procedure well without any intra-op complications. Pt doing well at this time.  Denies CP/SOB/Dizziness/N/V/D/HA. Pain is controlled.     Vital Signs Last 24 Hrs  T(C): 36.5 (28 Mar 2025 13:40), Max: 36.9 (28 Mar 2025 06:07)  T(F): 97.7 (28 Mar 2025 13:40), Max: 98.5 (28 Mar 2025 06:07)  HR: 91 (28 Mar 2025 13:40) (75 - 91)  BP: 125/71 (28 Mar 2025 13:40) (102/67 - 125/71)  BP(mean): --  RR: 16 (28 Mar 2025 13:40) (10 - 18)  SpO2: 97% (28 Mar 2025 13:40) (96% - 100%)    Parameters below as of 28 Mar 2025 13:40  Patient On (Oxygen Delivery Method): room air        GEN: NAD  RLE: Dressing C/D/I.   B/L LE's: Motor intact + EHL/FHL/TA/GS in the BL LE. Sensation is grossly intact B/L. Extremities warm B/L. Compartments soft, compressible B/L, no calf tenderness B/L. DP 2+ B/L.    Labs:                          12.1   5.20  )-----------( 216      ( 28 Mar 2025 09:49 )             37.5       03-28    142  |  109[H]  |  18  ----------------------------<  177[H]  3.9   |  28  |  0.62    Ca    9.0      28 Mar 2025 09:49        A/P: Patient is a 55y y/o Female s/p R TKA, POD #0  -wound care, isometric exercises, GI motility, new medications, hospital course and discharge planning reviewed with pt  -Pain control/analgesia  -Inc spirometry reviewed and counseled  -SCD's to B/L LE  -F/U AM Labs  -PT/OT/WBAT  -Antibiotic 24hours post-op  -Anticoagulation: ASA BID   Post op Note  Patient is s/p L TKA under spinal anesthesia. Pt tolerated procedure well without any intra-op complications. Pt doing well at this time.  Denies CP/SOB/Dizziness/N/V/D/HA. Pain is controlled.     Vital Signs Last 24 Hrs  T(C): 36.5 (28 Mar 2025 13:40), Max: 36.9 (28 Mar 2025 06:07)  T(F): 97.7 (28 Mar 2025 13:40), Max: 98.5 (28 Mar 2025 06:07)  HR: 91 (28 Mar 2025 13:40) (75 - 91)  BP: 125/71 (28 Mar 2025 13:40) (102/67 - 125/71)  BP(mean): --  RR: 16 (28 Mar 2025 13:40) (10 - 18)  SpO2: 97% (28 Mar 2025 13:40) (96% - 100%)    Parameters below as of 28 Mar 2025 13:40  Patient On (Oxygen Delivery Method): room air        GEN: NAD  LLE: Dressing C/D/I.   B/L LE's: Motor intact + EHL/FHL/TA/GS in the BL LE. Sensation is grossly intact B/L. Extremities warm B/L. Compartments soft, compressible B/L, no calf tenderness B/L. DP 2+ B/L.    Labs:                          12.1   5.20  )-----------( 216      ( 28 Mar 2025 09:49 )             37.5       03-28    142  |  109[H]  |  18  ----------------------------<  177[H]  3.9   |  28  |  0.62    Ca    9.0      28 Mar 2025 09:49        A/P: Patient is a 55y y/o Female s/p L TKA, POD #0  -wound care, isometric exercises, GI motility, new medications, hospital course and discharge planning reviewed with pt  -Pain control/analgesia  -Inc spirometry reviewed and counseled  -SCD's to B/L LE  -F/U AM Labs  -PT/OT/WBAT  -Antibiotic 24hours post-op  -Anticoagulation: Xarelto daily    Post op Note  Patient is s/p R TKA under spinal anesthesia. Pt tolerated procedure well without any intra-op complications. Pt doing well at this time.  Denies CP/SOB/Dizziness/N/V/D/HA. Pain is controlled.     Vital Signs Last 24 Hrs  T(C): 36.5 (28 Mar 2025 13:40), Max: 36.9 (28 Mar 2025 06:07)  T(F): 97.7 (28 Mar 2025 13:40), Max: 98.5 (28 Mar 2025 06:07)  HR: 91 (28 Mar 2025 13:40) (75 - 91)  BP: 125/71 (28 Mar 2025 13:40) (102/67 - 125/71)  BP(mean): --  RR: 16 (28 Mar 2025 13:40) (10 - 18)  SpO2: 97% (28 Mar 2025 13:40) (96% - 100%)    Parameters below as of 28 Mar 2025 13:40  Patient On (Oxygen Delivery Method): room air        GEN: NAD  RLE: Dressing C/D/I.   B/L LE's: Motor intact + EHL/FHL/TA/GS in the BL LE. Sensation is grossly intact B/L. Extremities warm B/L. Compartments soft, compressible B/L, no calf tenderness B/L. DP 2+ B/L.    Labs:                          12.1   5.20  )-----------( 216      ( 28 Mar 2025 09:49 )             37.5       03-28    142  |  109[H]  |  18  ----------------------------<  177[H]  3.9   |  28  |  0.62    Ca    9.0      28 Mar 2025 09:49        A/P: Patient is a 55y y/o Female s/p R TKA, POD #0  -wound care, isometric exercises, GI motility, new medications, hospital course and discharge planning reviewed with pt  -Pain control/analgesia  -Inc spirometry reviewed and counseled  -SCD's to B/L LE  -F/U AM Labs  -PT/OT/WBAT  -Antibiotic 24hours post-op  -Anticoagulation: ASA BID

## 2025-03-28 NOTE — PHYSICAL THERAPY INITIAL EVALUATION ADULT - GENERAL OBSERVATIONS, REHAB EVAL
2nd trial: Pt recd supine nad  +FLETCHER  2 daughters ( both nurses) at bedside).Pt is also a nurse.  Pt c/o heaviness of right lower limb . ABle to do active straight leg raise 50*

## 2025-03-28 NOTE — DISCHARGE NOTE PROVIDER - CARE PROVIDER_API CALL
Ivan Reddy  Orthopaedic Surgery  [address], [address]  [place] [address]  Phone: [phone]  Fax: [phone]  Follow Up Time:

## 2025-03-28 NOTE — DISCHARGE NOTE PROVIDER - NSDCFUADDAPPT_GEN_ALL_CORE_FT
Follow up with your surgeon in two weeks. Call for appointment.    If you need more pain medications, call your surgeon's office. For medication refills or authorizations call [phone]    Make sure to have a bowel movement by 2 days after surgery. Take stool softners and laxatives as needed.    Call and schedule a follow up appointment with your primary care physician for repeat blood work (CBC and BMP) for post hospital discharge follow-up care.    Call your surgeon if you have increased redness/pain/drainage or fever. Return to ER for shortness of breath/calf tenderness.

## 2025-03-28 NOTE — ASU PATIENT PROFILE, ADULT - NS PRO MODE OF ARRIVAL
Regular UBI Medication List Lot No. Exp. Date NDC No.   Heparin 10,000units  x 4 vials  [id number] 02/01/2022  [id number]   Lidocaine 1% 20 mL  [id number] 09/01/2021  [id number]   Sterile water  10 mL [id number] 03/01/2023  [id number]   Sodium Bicarbonate 8.4% -EV 02/01/2022  [id number]              ambulatory

## 2025-03-28 NOTE — DISCHARGE NOTE NURSING/CASE MANAGEMENT/SOCIAL WORK - FINANCIAL ASSISTANCE
[facility] provides services at a reduced cost to those who are determined to be eligible through [facility]’s financial assistance program. Information regarding [facility]’s financial assistance program can be found by going to [email or web address] or by calling [phone].

## 2025-03-28 NOTE — PHYSICAL THERAPY INITIAL EVALUATION ADULT - ADDITIONAL COMMENTS
Pt lives with daughter and her family (Who can assist post op) in a private house with 3 steps with no rails in front entrance . Once inside, the pt has 12 steps with a L handrail down to basement where the bedroom and bathroom is. From the back entrance there are 2 steps  to enter basement with no handrails. Dtr Estelle stated that she can park the car close to the back entrance. The pt's bathroom has a walk in shower stall, fixed/retractable shower head, standard toilet seat and no grab bars.

## 2025-03-28 NOTE — DISCHARGE NOTE PROVIDER - NSDCFUADDINST_GEN_ALL_CORE_FT
Keep FLETCHER Dressing Clean, Dry and Intact. May shower with FLETCHER Dressing. Please do not scrub, soak, peel or pick at the FLETCHER dressing. No creams, lotions, or oils over dressing. May shower and let water run over dressing, no baths. Pat dry once out of shower. Dressing to be removed in 7 days. If dressing is saturated from border to border - may remove and replace with clean dry dressing.    Shower instructions for FLETCHER Dressing: Turn battery pack off. Twist OFF battery pack before entering shower. Once done with showering. Pat dressing dry. Reconnect and twist ON battery pack after you are dry. Then turn battery pack on.     Keep knee straight while at rest. Elevate the leg as much as possible ("toes above the nose") to help control swelling. Make sure you get up and take a brief walk every two hours to help with circulation and prevent stiffness. Incentive spirometer 10X/hour. Cryocuff to help with pain/inflammation.     [facility] ext [phone] at [facility]

## 2025-03-28 NOTE — PHYSICAL THERAPY INITIAL EVALUATION ADULT - ACTIVE RANGE OF MOTION EXAMINATION, REHAB EVAL
R knee ext 0* in supine & 90* flex in sitt/bilateral upper extremity Active ROM was WFL (within functional limits)/bilateral  lower extremity Active ROM was WFL (within functional limits)

## 2025-03-28 NOTE — ASU PATIENT PROFILE, ADULT - TEACHING/LEARNING LEARNING PREFERENCES
individual instruction/verbal instruction [SOB] : shortness of breath [Palpitations] : palpitations [Headache] : no headache [Feeling Fatigued] : not feeling fatigued [Dyspnea on exertion] : not dyspnea during exertion [Lower Ext Edema] : no extremity edema [Orthopnea] : no orthopnea [Cough] : no cough [Dizziness] : no dizziness [Easy Bleeding] : no tendency for easy bleeding

## 2025-03-28 NOTE — DISCHARGE NOTE PROVIDER - NSDCMRMEDTOKEN_GEN_ALL_CORE_FT
acetaminophen 500 mg oral tablet: 2 tab(s) orally every 8 hours As needed Mild Pain (1 - 3)  aspirin 81 mg oral delayed release tablet: 1 tab(s) orally 2 times a day for 30 days MDD: 2 tablets  canagliflozin: 1 tab(s) orally once a day  celecoxib 200 mg oral capsule: 1 cap(s) orally every 12 hours MDD: 2 tablets  gabapentin 300 mg oral capsule: 1 cap(s) orally every 12 hours  Glimepiride: 1 tab(s) orally once a day  metFORMIN: 1 tab(s) orally once a day  Multiple Vitamins oral tablet: 1 tab(s) orally once a day  Narcan 4 mg/0.1 mL nasal spray: 4 milligram(s) intranasally once , repeat as necessary. as needed for suspected opiate overdose MDD: 0.2ml  ondansetron 4 mg oral tablet: 1 tab(s) orally every 6 hours MDD: 4 tablets  oxyCODONE 10 mg oral tablet: 1 tab(s) orally every 4 hours as needed for pain MDD: 6 tablets  pantoprazole 40 mg oral delayed release tablet: 1 tab(s) orally once a day (before a meal)  semaglutide: 1 tab(s) orally once a day  senna leaf extract oral tablet: 2 tab(s) orally once a day (at bedtime)   acetaminophen 500 mg oral tablet: 2 tab(s) orally every 8 hours As needed Mild Pain (1 - 3)  aspirin 81 mg oral delayed release tablet: 1 tab(s) orally 2 times a day for 30 days MDD: 2 tablets  canagliflozin: 1 tab(s) orally once a day  celecoxib 200 mg oral capsule: 1 cap(s) orally every 12 hours MDD: 2 tablets  cyclobenzaprine 10 mg oral tablet: 1 tab(s) orally every 8 hours as needed for muscle spasm MDD: 3 tablets  gabapentin 300 mg oral capsule: 1 cap(s) orally every 12 hours  Glimepiride: 1 tab(s) orally once a day  metFORMIN: 1 tab(s) orally once a day  Multiple Vitamins oral tablet: 1 tab(s) orally once a day  Narcan 4 mg/0.1 mL nasal spray: 4 milligram(s) intranasally once , repeat as necessary. as needed for suspected opiate overdose MDD: 0.2ml  ondansetron 4 mg oral tablet: 1 tab(s) orally every 6 hours MDD: 4 tablets  oxyCODONE 10 mg oral tablet: 1 tab(s) orally every 4 hours as needed for pain MDD: 6 tablets  pantoprazole 40 mg oral delayed release tablet: 1 tab(s) orally once a day (before a meal)  semaglutide: 1 tab(s) orally once a day  senna leaf extract oral tablet: 2 tab(s) orally once a day (at bedtime)

## 2025-03-28 NOTE — DISCHARGE NOTE PROVIDER - HOSPITAL COURSE
55yFemale with history of OA presenting for R TKA by Dr. Reddy on 3/28/2025. Risk and benefits of surgery were explained to the patient. The patient understood and agreed to proceed with surgery. Patient underwent the procedure with no intraoperative complications. Pt was brought in stable condition to the PACU. Once stable in PACU, pt was brought to the floor. During hospital stay pt was followed by Medicine, physical therapy, Home Care during this admission. Pt had an uneventful hospital course. Pt is stable for discharge to home.

## 2025-03-28 NOTE — PATIENT PROFILE ADULT - FALL HARM RISK - HARM RISK INTERVENTIONS
Assistance with ambulation/Assistance OOB with selected safe patient handling equipment/Communicate Risk of Fall with Harm to all staff/Discuss with provider need for PT consult/Monitor gait and stability/Provide patient with walking aids - walker, cane, crutches/Reinforce activity limits and safety measures with patient and family/Sit up slowly, dangle for a short time, stand at bedside before walking/Tailored Fall Risk Interventions/Use of alarms - bed, chair and/or voice tab/Visual Cue: Yellow wristband and red socks/Bed in lowest position, wheels locked, appropriate side rails in place/Call bell, personal items and telephone in reach/Instruct patient to call for assistance before getting out of bed or chair/Non-slip footwear when patient is out of bed/[place] to call system/Physically safe environment - no spills, clutter or unnecessary equipment/Purposeful Proactive Rounding/Room/bathroom lighting operational, light cord in reach

## 2025-03-28 NOTE — OCCUPATIONAL THERAPY INITIAL EVALUATION ADULT - ADDITIONAL COMMENTS
pre op assessment - Pt lives with daughter and her family (Who can assist post op) in a private house with 3 steps with no rails. Once inside, the pt has 12 steps with a L handrail down to where the bedroom and bathroom is. The pts bathroom has a walk in shower stall, fixed/retractable shower head, standard toilet seat and no grab bars.

## 2025-03-28 NOTE — PROGRESS NOTE ADULT - ASSESSMENT
DOS: 3/28/25        ATTENDING SURGEON: Ivan Reddy MD    ASSISTANT: MOOK Taylor and Robin Vegas MD    ANESTHESIA: spinal + regional    PREOPERATIVE DIAGNOSIS: Right Knee Osteoarthritis M17.9    POST OPERATIVE DIAGNOSIS: Right Knee Osteoarthritis M17.9    OPERATION:  Right Total knee arthoplasty, CPT 53104  Computer assisted surgical navigation procedure without images CPT 93712    INSTRUMENTATION USED:   [place] triathlon  Femoral component CR Pressfit size 2  Tibial base plate, Pressfit size 3  polyethylene tibial highly cross linked X3 insert CS size 9  Patellar component, symmetric size 32    INDICATION FOR THE PROCEDURE: The patient presented with severe osteoarthritis of the knee and pain with ambulation during daily activities. Conservative management has failed to alleviate the pain. The patient was thus indicated for the above mentioned procedure.    All risks and benefits of the procedure were explained to the patient. The patient fully understood the procedure and freely consented.    PROCEDURE IN DETAIL:  The patient was taken to the operating room and after anesthetic induction, was placed onto the surgical table in a supine position. A well padded tourniquet was placed over the proximal thigh. The skin and operative site were prepped and draped in the usual sterile fashion. A proper timeout was performed prior to the incision.    An anterior incision was made over the extensor mechanism extending from the tibial tubercle to proximal pole of the patella. Medial full thickness subcutaneus skin flaps were elevated. A midvastus arthrotomy was performed. Minimal elevation of the MCL was performed. The fat pad was excised and anterior portions of the medial and lateral meniscus were excises. The patella was everted and denervated. The thickness of the patella was measured and provisional cut was made.    The knee was flexed and patellar retracted laterally. Pin and arrays were placed in the distal femur and midshaft tibia to allow for registration of jaden landmarks. Both the femur and tibia were  registered in the standard fashion. Next the ACL was transected and osteophytes were removed. The medial and lateral gaps were assessed in both flexion and extension. Adjustments were made to the planned cuts to optimize balance, alignment, rotation, and range of motion. The [employer] robotic arm was brought into the field and the cuts were performed on both the tibia and femur and the bone excised.    Using a laminar , both the medial and lateral menisci were removed. Posterior osteophytes were removed with a curved osteotome and pituitary. The knee was flexed and the appropriate sized tibial tray was placed in the correct rotation. Care was taken to ensure no overhanging of the tray. The trial liner and the distal femur was placed. The knee was taken through full range of motion. There was full extension and excellent flexion. The gaps were assessed with the [place] robot and found to be sufficient.     Remaining free hand resection of the patella was performed and a guide was used to drill the peg holes. The knee was taken through full range of motion and the patella was found to track within the trochlea. The tibial tray was pinned in place and keel was punched. The lug holes for the distal femur were drilled.    All the arrays and checkpoints were removed at this time and the cut bone surfaces were thoroughly irrigated with normal saline. The tibia was pressfit and the proper polyethylene insert was placed., followed by the femur and patella. The knee was soaked with betadine and copiously irrigated.  Meticulous hemostasis was obtained.     The arthrotomy was closed with a barbed suture as was the subcutaneus layer and skin. A sterile occlusive dressing was placed. The patient was taken to the recovery room in stable condition. There were no complications during the procedure.  The assistance of a skilled physician assistant was required for the completion of the surgery.

## 2025-03-28 NOTE — PATIENT PROFILE ADULT - NSTRANSFERBELONGINGSDISPO_GEN_A_NUR
Making     Medications   0.9 % sodium chloride bolus (0 mLs Intravenous Stopped 11/20/18 1236)   ondansetron (ZOFRAN) injection 4 mg (4 mg Intravenous Given 11/20/18 1221)   ketorolac (TORADOL) injection 30 mg (30 mg Intravenous Given 11/20/18 1220)   iopamidol (ISOVUE-370) 76 % injection 100 mL (100 mLs Intravenous Given 11/20/18 1332)        Re-examination:  11/20/18       Patient was informed of all diagnostic test results that are back at this time. White count is elevated 17.2. Secondary to this CT of the abdomen will be ordered. Patient has not had any emesis here in the emergency department. Has a nonsurgical abdomen at this time. Patient has notad any emesis here in the emergency department. CT reads as above. Nonsurgical abdomen. Patient's abdominal pain improved with medication. Patient will be given supportive medications for home. He has been able tolerate oral fluid here in the emergency Department. Patient was instructed to follow-up with his primary care provider. Was instructed to return to the ER for new or worsening symptoms. Work excuse was provided per his request.    Consult(s):   none. Procedure(s):   none    MDM:   Patient presents to the emergency department today for nausea and vomiting followed by epigastric abdominal pain that began yesterday around 4 AM. Patient had multiple diagnostic tests in the emergency department. Patient's WBC count was elevated at 17.2. Other diagnostic test results were reassuring. CT of the abdomen was ordered which shows gastritis or colitis. Patient has a nonsurgical abdomen on reevaluation prior to disposition. Patient was instructed to follow-up with his primary care provider. He'll be given supportive medications for home. His pain has improved with medication here in the emergency Department. Patient has been able tolerate oral fluids in the ER without any problem. Has had no emesis in the ER.  Patient was instructed to return to the ER for new or not applicable Negative

## 2025-03-28 NOTE — DISCHARGE NOTE NURSING/CASE MANAGEMENT/SOCIAL WORK - PATIENT PORTAL LINK FT
You can access the FollowMyHealth Patient Portal offered by [facility] by registering at the following website: [email or web address]. By joining [facility]’s FollowMyHealth portal, you will also be able to view your health information using other applications (apps) compatible with our system.

## 2025-03-29 LAB
ANION GAP SERPL CALC-SCNC: 6 MMOL/L — SIGNIFICANT CHANGE UP (ref 5–17)
BUN SERPL-MCNC: 17 MG/DL — SIGNIFICANT CHANGE UP (ref 7–23)
CALCIUM SERPL-MCNC: 8.7 MG/DL — SIGNIFICANT CHANGE UP (ref 8.5–10.1)
CHLORIDE SERPL-SCNC: 106 MMOL/L — SIGNIFICANT CHANGE UP (ref 96–108)
CO2 SERPL-SCNC: 26 MMOL/L — SIGNIFICANT CHANGE UP (ref 22–31)
CREAT SERPL-MCNC: 0.44 MG/DL — LOW (ref 0.5–1.3)
EGFR: 113 ML/MIN/1.73M2 — SIGNIFICANT CHANGE UP
EGFR: 113 ML/MIN/1.73M2 — SIGNIFICANT CHANGE UP
GLUCOSE BLDC GLUCOMTR-MCNC: 193 MG/DL — HIGH (ref 70–99)
GLUCOSE BLDC GLUCOMTR-MCNC: 247 MG/DL — HIGH (ref 70–99)
GLUCOSE BLDC GLUCOMTR-MCNC: 296 MG/DL — HIGH (ref 70–99)
GLUCOSE BLDC GLUCOMTR-MCNC: 339 MG/DL — HIGH (ref 70–99)
GLUCOSE SERPL-MCNC: 207 MG/DL — HIGH (ref 70–99)
HCT VFR BLD CALC: 33 % — LOW (ref 34.5–45)
HGB BLD-MCNC: 10.7 G/DL — LOW (ref 11.5–15.5)
MCHC RBC-ENTMCNC: 30.3 PG — SIGNIFICANT CHANGE UP (ref 27–34)
MCHC RBC-ENTMCNC: 32.4 G/DL — SIGNIFICANT CHANGE UP (ref 32–36)
MCV RBC AUTO: 93.5 FL — SIGNIFICANT CHANGE UP (ref 80–100)
NRBC BLD AUTO-RTO: 0 /100 WBCS — SIGNIFICANT CHANGE UP (ref 0–0)
PLATELET # BLD AUTO: 209 K/UL — SIGNIFICANT CHANGE UP (ref 150–400)
POTASSIUM SERPL-MCNC: 4.3 MMOL/L — SIGNIFICANT CHANGE UP (ref 3.5–5.3)
POTASSIUM SERPL-SCNC: 4.3 MMOL/L — SIGNIFICANT CHANGE UP (ref 3.5–5.3)
RBC # BLD: 3.53 M/UL — LOW (ref 3.8–5.2)
RBC # FLD: 12.9 % — SIGNIFICANT CHANGE UP (ref 10.3–14.5)
SODIUM SERPL-SCNC: 138 MMOL/L — SIGNIFICANT CHANGE UP (ref 135–145)
WBC # BLD: 7.85 K/UL — SIGNIFICANT CHANGE UP (ref 3.8–10.5)
WBC # FLD AUTO: 7.85 K/UL — SIGNIFICANT CHANGE UP (ref 3.8–10.5)

## 2025-03-29 RX ADMIN — OXYCODONE HYDROCHLORIDE 10 MILLIGRAM(S): 30 TABLET ORAL at 21:05

## 2025-03-29 RX ADMIN — Medication 2 TABLET(S): at 21:25

## 2025-03-29 RX ADMIN — CELECOXIB 200 MILLIGRAM(S): 50 CAPSULE ORAL at 06:24

## 2025-03-29 RX ADMIN — OXYCODONE HYDROCHLORIDE 10 MILLIGRAM(S): 30 TABLET ORAL at 20:07

## 2025-03-29 RX ADMIN — CELECOXIB 200 MILLIGRAM(S): 50 CAPSULE ORAL at 05:57

## 2025-03-29 RX ADMIN — CELECOXIB 200 MILLIGRAM(S): 50 CAPSULE ORAL at 17:15

## 2025-03-29 RX ADMIN — INSULIN LISPRO 4: 100 INJECTION, SOLUTION INTRAVENOUS; SUBCUTANEOUS at 11:39

## 2025-03-29 RX ADMIN — Medication 500 MILLIGRAM(S): at 05:57

## 2025-03-29 RX ADMIN — Medication 81 MILLIGRAM(S): at 17:16

## 2025-03-29 RX ADMIN — Medication 81 MILLIGRAM(S): at 05:57

## 2025-03-29 RX ADMIN — Medication 500 MILLIGRAM(S): at 17:15

## 2025-03-29 RX ADMIN — POLYETHYLENE GLYCOL 3350 17 GRAM(S): 17 POWDER, FOR SOLUTION ORAL at 21:24

## 2025-03-29 RX ADMIN — GABAPENTIN 300 MILLIGRAM(S): 400 CAPSULE ORAL at 17:16

## 2025-03-29 RX ADMIN — INSULIN LISPRO 1: 100 INJECTION, SOLUTION INTRAVENOUS; SUBCUTANEOUS at 08:15

## 2025-03-29 RX ADMIN — CELECOXIB 200 MILLIGRAM(S): 50 CAPSULE ORAL at 18:15

## 2025-03-29 RX ADMIN — OXYCODONE HYDROCHLORIDE 10 MILLIGRAM(S): 30 TABLET ORAL at 04:50

## 2025-03-29 RX ADMIN — INSULIN LISPRO 2: 100 INJECTION, SOLUTION INTRAVENOUS; SUBCUTANEOUS at 16:06

## 2025-03-29 RX ADMIN — OXYCODONE HYDROCHLORIDE 10 MILLIGRAM(S): 30 TABLET ORAL at 17:05

## 2025-03-29 RX ADMIN — INSULIN LISPRO 1: 100 INJECTION, SOLUTION INTRAVENOUS; SUBCUTANEOUS at 21:26

## 2025-03-29 RX ADMIN — KETOROLAC TROMETHAMINE 15 MILLIGRAM(S): 30 INJECTION, SOLUTION INTRAMUSCULAR; INTRAVENOUS at 00:06

## 2025-03-29 RX ADMIN — DEXAMETHASONE 101.6 MILLIGRAM(S): 0.5 TABLET ORAL at 05:56

## 2025-03-29 RX ADMIN — KETOROLAC TROMETHAMINE 15 MILLIGRAM(S): 30 INJECTION, SOLUTION INTRAMUSCULAR; INTRAVENOUS at 01:00

## 2025-03-29 RX ADMIN — OXYCODONE HYDROCHLORIDE 5 MILLIGRAM(S): 30 TABLET ORAL at 10:35

## 2025-03-29 RX ADMIN — Medication 1 TABLET(S): at 11:39

## 2025-03-29 RX ADMIN — OXYCODONE HYDROCHLORIDE 10 MILLIGRAM(S): 30 TABLET ORAL at 16:05

## 2025-03-29 RX ADMIN — GABAPENTIN 300 MILLIGRAM(S): 400 CAPSULE ORAL at 05:57

## 2025-03-29 RX ADMIN — OXYCODONE HYDROCHLORIDE 10 MILLIGRAM(S): 30 TABLET ORAL at 03:52

## 2025-03-29 RX ADMIN — OXYCODONE HYDROCHLORIDE 5 MILLIGRAM(S): 30 TABLET ORAL at 11:35

## 2025-03-29 RX ADMIN — KETOROLAC TROMETHAMINE 15 MILLIGRAM(S): 30 INJECTION, SOLUTION INTRAMUSCULAR; INTRAVENOUS at 05:57

## 2025-03-29 RX ADMIN — Medication 100 MILLIGRAM(S): at 00:06

## 2025-03-29 RX ADMIN — Medication 40 MILLIGRAM(S): at 05:57

## 2025-03-29 RX ADMIN — KETOROLAC TROMETHAMINE 15 MILLIGRAM(S): 30 INJECTION, SOLUTION INTRAMUSCULAR; INTRAVENOUS at 06:25

## 2025-03-29 NOTE — PROGRESS NOTE ADULT - SUBJECTIVE AND OBJECTIVE BOX
Patient seen and examined. Pt doing well at this time, pain well controlled.  Denies CP/SOB/Dizziness/N/V/D/HA.       VITAL SIGNS:  T(C): 37.1 (03-29-25 @ 05:00), Max: 37.1 (03-29-25 @ 05:00)  HR: 101 (03-29-25 @ 05:00) (75 - 108)  BP: 101/66 (03-29-25 @ 05:00) (101/66 - 125/71)  RR: 18 (03-29-25 @ 05:00) (10 - 18)  SpO2: 95% (03-29-25 @ 05:00) (95% - 100%)    GEN: NAD  RLE: Dressing C/D/I.   B/L LE's: Motor intact + EHL/FHL/TA/GS in the BL LE. Sensation is grossly intact B/L. Extremities warm B/L. Compartments soft, compressible B/L, no calf tenderness B/L. DP 2+ B/L.    LABS:                        10.7   7.85  )-----------( 209      ( 29 Mar 2025 05:30 )             33.0     03-29    138  |  106  |  17  ----------------------------<  207[H]  4.3   |  26  |  0.44[L]    Ca    8.7      29 Mar 2025 05:30        Urinalysis Basic - ( 29 Mar 2025 05:30 )    Color: x / Appearance: x / SG: x / pH: x  Gluc: 207 mg/dL / Ketone: x  / Bili: x / Urobili: x   Blood: x / Protein: x / Nitrite: x   Leuk Esterase: x / RBC: x / WBC x   Sq Epi: x / Non Sq Epi: x / Bacteria: x      A/P: Patient is a 55y y/o Female s/p R TKA, POD #1  -wound care, isometric exercises, GI motility, new medications, hospital course and discharge planning reviewed with pt  -Pain control/analgesia  -Inc spirometry reviewed and counseled  -SCD's to B/L LE  -F/U AM Labs  -PT/OT/WBAT  -Antibiotic 24hours post-op  -Anticoagulation: ASA BID   -Discharge home today

## 2025-03-30 VITALS
OXYGEN SATURATION: 100 % | RESPIRATION RATE: 17 BRPM | SYSTOLIC BLOOD PRESSURE: 112 MMHG | TEMPERATURE: 98 F | HEART RATE: 93 BPM | DIASTOLIC BLOOD PRESSURE: 75 MMHG

## 2025-03-30 LAB
GLUCOSE BLDC GLUCOMTR-MCNC: 129 MG/DL — HIGH (ref 70–99)
GLUCOSE BLDC GLUCOMTR-MCNC: 216 MG/DL — HIGH (ref 70–99)

## 2025-03-30 RX ORDER — OXYCODONE HYDROCHLORIDE 30 MG/1
1 TABLET ORAL
Qty: 30 | Refills: 0
Start: 2025-03-30 | End: 2025-04-03

## 2025-03-30 RX ADMIN — Medication 81 MILLIGRAM(S): at 06:11

## 2025-03-30 RX ADMIN — GABAPENTIN 300 MILLIGRAM(S): 400 CAPSULE ORAL at 06:11

## 2025-03-30 RX ADMIN — CELECOXIB 200 MILLIGRAM(S): 50 CAPSULE ORAL at 07:10

## 2025-03-30 RX ADMIN — OXYCODONE HYDROCHLORIDE 10 MILLIGRAM(S): 30 TABLET ORAL at 06:11

## 2025-03-30 RX ADMIN — OXYCODONE HYDROCHLORIDE 10 MILLIGRAM(S): 30 TABLET ORAL at 11:50

## 2025-03-30 RX ADMIN — Medication 500 MILLIGRAM(S): at 06:11

## 2025-03-30 RX ADMIN — CELECOXIB 200 MILLIGRAM(S): 50 CAPSULE ORAL at 06:11

## 2025-03-30 RX ADMIN — INSULIN LISPRO 2: 100 INJECTION, SOLUTION INTRAVENOUS; SUBCUTANEOUS at 11:14

## 2025-03-30 RX ADMIN — OXYCODONE HYDROCHLORIDE 10 MILLIGRAM(S): 30 TABLET ORAL at 07:10

## 2025-03-30 RX ADMIN — OXYCODONE HYDROCHLORIDE 10 MILLIGRAM(S): 30 TABLET ORAL at 10:53

## 2025-03-30 RX ADMIN — Medication 40 MILLIGRAM(S): at 06:12

## 2025-03-30 NOTE — PROGRESS NOTE ADULT - SUBJECTIVE AND OBJECTIVE BOX
Patient seen and examined. Pt doing well at this time, pain well controlled.  Denies CP/SOB/Dizziness/N/V/D/HA. Patient wished to stay overnight yesterday due to pain and elevated HR.    Vital Signs (24 Hrs):  T(C): 36.7 (03-30-25 @ 05:30), Max: 37.1 (03-29-25 @ 14:48)  HR: 98 (03-30-25 @ 05:30) (96 - 124)  BP: 112/76 (03-30-25 @ 05:30) (105/58 - 133/77)  RR: 17 (03-30-25 @ 05:30) (16 - 18)  SpO2: 99% (03-30-25 @ 05:30) (95% - 99%)  Wt(kg): --    LABS:                          10.7   7.85  )-----------( 209      ( 29 Mar 2025 05:30 )             33.0     03-29    138  |  106  |  17  ----------------------------<  207[H]  4.3   |  26  |  0.44[L]    Ca    8.7      29 Mar 2025 05:30    GEN: NAD  RLE: Dressing C/D/I.   B/L LE's: Motor intact + EHL/FHL/TA/GS in the BL LE. Sensation is grossly intact B/L. Extremities warm B/L. Compartments soft, compressible B/L, no calf tenderness B/L. DP 2+ B/L.    A/P: Patient is a 55y y/o Female s/p R TKA, POD #2  -wound care, isometric exercises, GI motility, new medications, hospital course and discharge planning reviewed with pt  -Pain control/analgesia  -Inc spirometry reviewed and counseled  -SCD's to B/L LE  -F/U AM Labs  -PT/OT/WBAT  -Anticoagulation: ASA BID   -Discharge home today

## 2025-03-31 DIAGNOSIS — Z96.652 PRESENCE OF LEFT ARTIFICIAL KNEE JOINT: ICD-10-CM

## 2025-04-02 ENCOUNTER — INPATIENT (INPATIENT)
Facility: HOSPITAL | Age: 55
LOS: 1 days | Discharge: HOME HEALTH | End: 2025-04-04
Attending: HOSPITALIST | Admitting: HOSPITALIST
Payer: MEDICAID

## 2025-04-02 VITALS
RESPIRATION RATE: 18 BRPM | DIASTOLIC BLOOD PRESSURE: 73 MMHG | WEIGHT: 194.01 LBS | TEMPERATURE: 98 F | OXYGEN SATURATION: 96 % | HEART RATE: 127 BPM | SYSTOLIC BLOOD PRESSURE: 114 MMHG | HEIGHT: 64 IN

## 2025-04-02 DIAGNOSIS — K52.9 NONINFECTIVE GASTROENTERITIS AND COLITIS, UNSPECIFIED: ICD-10-CM

## 2025-04-02 DIAGNOSIS — Z96.652 PRESENCE OF LEFT ARTIFICIAL KNEE JOINT: ICD-10-CM

## 2025-04-02 DIAGNOSIS — Z29.9 ENCOUNTER FOR PROPHYLACTIC MEASURES, UNSPECIFIED: ICD-10-CM

## 2025-04-02 DIAGNOSIS — R11.2 NAUSEA WITH VOMITING, UNSPECIFIED: ICD-10-CM

## 2025-04-02 DIAGNOSIS — R00.0 TACHYCARDIA, UNSPECIFIED: ICD-10-CM

## 2025-04-02 DIAGNOSIS — R19.7 DIARRHEA, UNSPECIFIED: ICD-10-CM

## 2025-04-02 DIAGNOSIS — Z96.651 PRESENCE OF RIGHT ARTIFICIAL KNEE JOINT: ICD-10-CM

## 2025-04-02 DIAGNOSIS — E11.9 TYPE 2 DIABETES MELLITUS WITHOUT COMPLICATIONS: ICD-10-CM

## 2025-04-02 DIAGNOSIS — Z98.890 OTHER SPECIFIED POSTPROCEDURAL STATES: Chronic | ICD-10-CM

## 2025-04-02 LAB
ALBUMIN SERPL ELPH-MCNC: 3.7 G/DL — SIGNIFICANT CHANGE UP (ref 3.3–5)
ALP SERPL-CCNC: 89 U/L — SIGNIFICANT CHANGE UP (ref 40–120)
ALT FLD-CCNC: 24 U/L — SIGNIFICANT CHANGE UP (ref 12–78)
AMPHET UR-MCNC: NEGATIVE — SIGNIFICANT CHANGE UP
AMYLASE P1 CFR SERPL: 75 U/L — SIGNIFICANT CHANGE UP (ref 25–115)
ANION GAP SERPL CALC-SCNC: 17 MMOL/L — SIGNIFICANT CHANGE UP (ref 5–17)
APPEARANCE UR: CLEAR — SIGNIFICANT CHANGE UP
AST SERPL-CCNC: 18 U/L — SIGNIFICANT CHANGE UP (ref 15–37)
BACTERIA # UR AUTO: ABNORMAL /HPF
BARBITURATES UR SCN-MCNC: NEGATIVE — SIGNIFICANT CHANGE UP
BASOPHILS # BLD AUTO: 0.03 K/UL — SIGNIFICANT CHANGE UP (ref 0–0.2)
BASOPHILS NFR BLD AUTO: 0.4 % — SIGNIFICANT CHANGE UP (ref 0–2)
BENZODIAZ UR-MCNC: NEGATIVE — SIGNIFICANT CHANGE UP
BILIRUB SERPL-MCNC: 0.9 MG/DL — SIGNIFICANT CHANGE UP (ref 0.2–1.2)
BILIRUB UR-MCNC: NEGATIVE — SIGNIFICANT CHANGE UP
BUN SERPL-MCNC: 19 MG/DL — SIGNIFICANT CHANGE UP (ref 7–23)
CALCIUM SERPL-MCNC: 10.1 MG/DL — SIGNIFICANT CHANGE UP (ref 8.5–10.1)
CHLORIDE SERPL-SCNC: 101 MMOL/L — SIGNIFICANT CHANGE UP (ref 96–108)
CO2 SERPL-SCNC: 15 MMOL/L — LOW (ref 22–31)
COCAINE METAB.OTHER UR-MCNC: NEGATIVE — SIGNIFICANT CHANGE UP
COLOR SPEC: YELLOW — SIGNIFICANT CHANGE UP
CREAT SERPL-MCNC: 0.7 MG/DL — SIGNIFICANT CHANGE UP (ref 0.5–1.3)
DIFF PNL FLD: NEGATIVE — SIGNIFICANT CHANGE UP
EGFR: 101 ML/MIN/1.73M2 — SIGNIFICANT CHANGE UP
EGFR: 101 ML/MIN/1.73M2 — SIGNIFICANT CHANGE UP
EOSINOPHIL # BLD AUTO: 0 K/UL — SIGNIFICANT CHANGE UP (ref 0–0.5)
EOSINOPHIL NFR BLD AUTO: 0 % — SIGNIFICANT CHANGE UP (ref 0–6)
EPI CELLS # UR: PRESENT
GLUCOSE BLDC GLUCOMTR-MCNC: 111 MG/DL — HIGH (ref 70–99)
GLUCOSE BLDC GLUCOMTR-MCNC: 127 MG/DL — HIGH (ref 70–99)
GLUCOSE SERPL-MCNC: 166 MG/DL — HIGH (ref 70–99)
GLUCOSE UR QL: >=1000 MG/DL
HCT VFR BLD CALC: 41.2 % — SIGNIFICANT CHANGE UP (ref 34.5–45)
HGB BLD-MCNC: 12.9 G/DL — SIGNIFICANT CHANGE UP (ref 11.5–15.5)
IMM GRANULOCYTES NFR BLD AUTO: 0.7 % — SIGNIFICANT CHANGE UP (ref 0–0.9)
KETONES UR-MCNC: >=160 MG/DL
LACTATE SERPL-SCNC: 1.4 MMOL/L — SIGNIFICANT CHANGE UP (ref 0.7–2)
LEUKOCYTE ESTERASE UR-ACNC: NEGATIVE — SIGNIFICANT CHANGE UP
LIDOCAIN IGE QN: 9 U/L — LOW (ref 13–75)
LYMPHOCYTES # BLD AUTO: 1.23 K/UL — SIGNIFICANT CHANGE UP (ref 1–3.3)
LYMPHOCYTES # BLD AUTO: 17 % — SIGNIFICANT CHANGE UP (ref 13–44)
MCHC RBC-ENTMCNC: 30.1 PG — SIGNIFICANT CHANGE UP (ref 27–34)
MCHC RBC-ENTMCNC: 31.3 G/DL — LOW (ref 32–36)
MCV RBC AUTO: 96 FL — SIGNIFICANT CHANGE UP (ref 80–100)
METHADONE UR-MCNC: NEGATIVE — SIGNIFICANT CHANGE UP
MONOCYTES # BLD AUTO: 0.28 K/UL — SIGNIFICANT CHANGE UP (ref 0–0.9)
MONOCYTES NFR BLD AUTO: 3.9 % — SIGNIFICANT CHANGE UP (ref 2–14)
NEUTROPHILS # BLD AUTO: 5.66 K/UL — SIGNIFICANT CHANGE UP (ref 1.8–7.4)
NEUTROPHILS NFR BLD AUTO: 78 % — HIGH (ref 43–77)
NITRITE UR-MCNC: NEGATIVE — SIGNIFICANT CHANGE UP
NRBC BLD AUTO-RTO: 0 /100 WBCS — SIGNIFICANT CHANGE UP (ref 0–0)
OPIATES UR-MCNC: NEGATIVE — SIGNIFICANT CHANGE UP
PCP SPEC-MCNC: SIGNIFICANT CHANGE UP
PCP UR-MCNC: NEGATIVE — SIGNIFICANT CHANGE UP
PH UR: 5.5 — SIGNIFICANT CHANGE UP (ref 5–8)
PLATELET # BLD AUTO: 417 K/UL — HIGH (ref 150–400)
POTASSIUM SERPL-MCNC: 4.7 MMOL/L — SIGNIFICANT CHANGE UP (ref 3.5–5.3)
POTASSIUM SERPL-SCNC: 4.7 MMOL/L — SIGNIFICANT CHANGE UP (ref 3.5–5.3)
PROT SERPL-MCNC: 9.2 GM/DL — HIGH (ref 6–8.3)
PROT UR-MCNC: 30 MG/DL
RAPID RVP RESULT: SIGNIFICANT CHANGE UP
RBC # BLD: 4.29 M/UL — SIGNIFICANT CHANGE UP (ref 3.8–5.2)
RBC # FLD: 12.9 % — SIGNIFICANT CHANGE UP (ref 10.3–14.5)
RBC CASTS # UR COMP ASSIST: 0 /HPF — SIGNIFICANT CHANGE UP (ref 0–4)
SARS-COV-2 RNA SPEC QL NAA+PROBE: SIGNIFICANT CHANGE UP
SODIUM SERPL-SCNC: 133 MMOL/L — LOW (ref 135–145)
SP GR SPEC: >1.03 — HIGH (ref 1–1.03)
THC UR QL: NEGATIVE — SIGNIFICANT CHANGE UP
TROPONIN I, HIGH SENSITIVITY RESULT: 5.3 NG/L — SIGNIFICANT CHANGE UP
UROBILINOGEN FLD QL: 0.2 MG/DL — SIGNIFICANT CHANGE UP (ref 0.2–1)
WBC # BLD: 7.25 K/UL — SIGNIFICANT CHANGE UP (ref 3.8–10.5)
WBC # FLD AUTO: 7.25 K/UL — SIGNIFICANT CHANGE UP (ref 3.8–10.5)
WBC UR QL: 2 /HPF — SIGNIFICANT CHANGE UP (ref 0–5)

## 2025-04-02 PROCEDURE — 74177 CT ABD & PELVIS W/CONTRAST: CPT | Mod: 26

## 2025-04-02 PROCEDURE — 99222 1ST HOSP IP/OBS MODERATE 55: CPT

## 2025-04-02 PROCEDURE — 99285 EMERGENCY DEPT VISIT HI MDM: CPT

## 2025-04-02 PROCEDURE — 71275 CT ANGIOGRAPHY CHEST: CPT | Mod: 26

## 2025-04-02 PROCEDURE — 93010 ELECTROCARDIOGRAM REPORT: CPT

## 2025-04-02 RX ORDER — ORAL SEMAGLUTIDE 14 MG/1
1 TABLET ORAL
Qty: 0 | Refills: 0 | DISCHARGE

## 2025-04-02 RX ORDER — SENNA 187 MG
2 TABLET ORAL AT BEDTIME
Refills: 0 | Status: DISCONTINUED | OUTPATIENT
Start: 2025-04-02 | End: 2025-04-02

## 2025-04-02 RX ORDER — GLIMEPIRIDE 4 MG/1
0 TABLET ORAL
Qty: 0 | Refills: 0 | DISCHARGE

## 2025-04-02 RX ORDER — GLUCAGON 3 MG/1
1 POWDER NASAL ONCE
Refills: 0 | Status: DISCONTINUED | OUTPATIENT
Start: 2025-04-02 | End: 2025-04-04

## 2025-04-02 RX ORDER — METFORMIN HYDROCHLORIDE 850 MG/1
0 TABLET ORAL
Qty: 0 | Refills: 0 | DISCHARGE

## 2025-04-02 RX ORDER — ONDANSETRON HCL/PF 4 MG/2 ML
4 VIAL (ML) INJECTION ONCE
Refills: 0 | Status: COMPLETED | OUTPATIENT
Start: 2025-04-02 | End: 2025-04-02

## 2025-04-02 RX ORDER — DEXTROSE 50 % IN WATER 50 %
50 SYRINGE (ML) INTRAVENOUS ONCE
Refills: 0 | Status: DISCONTINUED | OUTPATIENT
Start: 2025-04-02 | End: 2025-04-04

## 2025-04-02 RX ORDER — CANAGLIFLOZIN 300 MG/1
1 TABLET, FILM COATED ORAL
Qty: 0 | Refills: 0 | DISCHARGE

## 2025-04-02 RX ORDER — INSULIN LISPRO 100 U/ML
INJECTION, SOLUTION INTRAVENOUS; SUBCUTANEOUS
Refills: 0 | Status: DISCONTINUED | OUTPATIENT
Start: 2025-04-02 | End: 2025-04-04

## 2025-04-02 RX ORDER — SODIUM CHLORIDE 9 G/1000ML
1000 INJECTION, SOLUTION INTRAVENOUS ONCE
Refills: 0 | Status: COMPLETED | OUTPATIENT
Start: 2025-04-02 | End: 2025-04-02

## 2025-04-02 RX ORDER — MAGNESIUM, ALUMINUM HYDROXIDE 200-200 MG
30 TABLET,CHEWABLE ORAL EVERY 4 HOURS
Refills: 0 | Status: DISCONTINUED | OUTPATIENT
Start: 2025-04-02 | End: 2025-04-04

## 2025-04-02 RX ORDER — ASPIRIN 325 MG
81 TABLET ORAL
Refills: 0 | Status: DISCONTINUED | OUTPATIENT
Start: 2025-04-02 | End: 2025-04-04

## 2025-04-02 RX ORDER — CELECOXIB 50 MG/1
200 CAPSULE ORAL EVERY 12 HOURS
Refills: 0 | Status: DISCONTINUED | OUTPATIENT
Start: 2025-04-02 | End: 2025-04-04

## 2025-04-02 RX ORDER — SEMAGLUTIDE 1 MG/.5ML
0 INJECTION, SOLUTION SUBCUTANEOUS
Qty: 0 | Refills: 1 | DISCHARGE

## 2025-04-02 RX ORDER — MELATONIN 5 MG
3 TABLET ORAL AT BEDTIME
Refills: 0 | Status: DISCONTINUED | OUTPATIENT
Start: 2025-04-02 | End: 2025-04-04

## 2025-04-02 RX ORDER — OXYCODONE HYDROCHLORIDE 30 MG/1
10 TABLET ORAL EVERY 6 HOURS
Refills: 0 | Status: DISCONTINUED | OUTPATIENT
Start: 2025-04-02 | End: 2025-04-04

## 2025-04-02 RX ORDER — INSULIN LISPRO 100 U/ML
INJECTION, SOLUTION INTRAVENOUS; SUBCUTANEOUS AT BEDTIME
Refills: 0 | Status: DISCONTINUED | OUTPATIENT
Start: 2025-04-02 | End: 2025-04-04

## 2025-04-02 RX ORDER — CYCLOBENZAPRINE HYDROCHLORIDE 15 MG/1
5 CAPSULE, EXTENDED RELEASE ORAL THREE TIMES A DAY
Refills: 0 | Status: DISCONTINUED | OUTPATIENT
Start: 2025-04-02 | End: 2025-04-04

## 2025-04-02 RX ORDER — ONDANSETRON HCL/PF 4 MG/2 ML
4 VIAL (ML) INJECTION EVERY 6 HOURS
Refills: 0 | Status: DISCONTINUED | OUTPATIENT
Start: 2025-04-02 | End: 2025-04-04

## 2025-04-02 RX ORDER — ACETAMINOPHEN 500 MG/5ML
1000 LIQUID (ML) ORAL ONCE
Refills: 0 | Status: COMPLETED | OUTPATIENT
Start: 2025-04-02 | End: 2025-04-02

## 2025-04-02 RX ORDER — GLIMEPIRIDE 4 MG/1
1 TABLET ORAL
Qty: 0 | Refills: 0 | DISCHARGE

## 2025-04-02 RX ORDER — CANAGLIFLOZIN 300 MG/1
0 TABLET, FILM COATED ORAL
Qty: 0 | Refills: 0 | DISCHARGE

## 2025-04-02 RX ORDER — GABAPENTIN 400 MG/1
300 CAPSULE ORAL EVERY 12 HOURS
Refills: 0 | Status: DISCONTINUED | OUTPATIENT
Start: 2025-04-02 | End: 2025-04-04

## 2025-04-02 RX ORDER — ACETAMINOPHEN 500 MG/5ML
650 LIQUID (ML) ORAL EVERY 6 HOURS
Refills: 0 | Status: DISCONTINUED | OUTPATIENT
Start: 2025-04-02 | End: 2025-04-04

## 2025-04-02 RX ORDER — METFORMIN HYDROCHLORIDE 850 MG/1
1 TABLET ORAL
Qty: 0 | Refills: 0 | DISCHARGE

## 2025-04-02 RX ORDER — SODIUM CHLORIDE 9 G/1000ML
1000 INJECTION, SOLUTION INTRAVENOUS
Refills: 0 | Status: DISCONTINUED | OUTPATIENT
Start: 2025-04-02 | End: 2025-04-04

## 2025-04-02 RX ADMIN — Medication 400 MILLIGRAM(S): at 12:40

## 2025-04-02 RX ADMIN — OXYCODONE HYDROCHLORIDE 10 MILLIGRAM(S): 30 TABLET ORAL at 23:43

## 2025-04-02 RX ADMIN — Medication 650 MILLIGRAM(S): at 21:33

## 2025-04-02 RX ADMIN — SODIUM CHLORIDE 1000 MILLILITER(S): 9 INJECTION, SOLUTION INTRAVENOUS at 17:53

## 2025-04-02 RX ADMIN — OXYCODONE HYDROCHLORIDE 10 MILLIGRAM(S): 30 TABLET ORAL at 16:35

## 2025-04-02 RX ADMIN — Medication 40 MILLIGRAM(S): at 12:40

## 2025-04-02 RX ADMIN — SODIUM CHLORIDE 100 MILLILITER(S): 9 INJECTION, SOLUTION INTRAVENOUS at 19:04

## 2025-04-02 RX ADMIN — OXYCODONE HYDROCHLORIDE 10 MILLIGRAM(S): 30 TABLET ORAL at 17:30

## 2025-04-02 RX ADMIN — GABAPENTIN 300 MILLIGRAM(S): 400 CAPSULE ORAL at 17:57

## 2025-04-02 RX ADMIN — Medication 1000 MILLIGRAM(S): at 22:22

## 2025-04-02 RX ADMIN — Medication 81 MILLIGRAM(S): at 17:57

## 2025-04-02 RX ADMIN — CYCLOBENZAPRINE HYDROCHLORIDE 5 MILLIGRAM(S): 15 CAPSULE, EXTENDED RELEASE ORAL at 20:44

## 2025-04-02 RX ADMIN — Medication 1000 MILLILITER(S): at 12:40

## 2025-04-02 RX ADMIN — Medication 4 MILLIGRAM(S): at 12:41

## 2025-04-02 RX ADMIN — Medication 150 MILLILITER(S): at 16:24

## 2025-04-02 RX ADMIN — Medication 650 MILLIGRAM(S): at 20:44

## 2025-04-02 NOTE — ED ADULT TRIAGE NOTE - CHIEF COMPLAINT QUOTE
c/o frequent N/V/D, lightheaded, cool/clammy, palpitations, and intermittent incoherence began 6PM yesterday. Daughter states pt at baseline at this time. R knee replacement here on Friday. PMH DM.

## 2025-04-02 NOTE — ED PROVIDER NOTE - CLINICAL SUMMARY MEDICAL DECISION MAKING FREE TEXT BOX
Patient with GI sx of n/v/d.  Pending evaluation with labs, UA, CT imaging of chest and abdomen.  Plan pending findings.  Will ask ortho to evaluate right post op knee. Patient with GI sx of n/v/d.  Pending evaluation with labs, UA, CT imaging of chest and abdomen.  Plan pending findings.  Will ask ortho to evaluate right post op knee.  EKG sinus tach, rate 120, no st e/d, no twi, qtc 480. Patient with GI sx of n/v/d.  Pending evaluation with labs, UA, CT imaging of chest and abdomen.  Plan pending findings.  Will ask ortho to evaluate right post op knee.  EKG sinus tach, rate 120, no st e/d, no twi, qtc 480.    Admit note:  Patient's labs and CT are benign, no significant findings.  Right knee evaluated by ortho.  Although work up is normal, patient did not do well with PO challenge, continues to be tachycardic in the 120's and feels weak.  Patient is to be admitted to the hospital and the case was discussed with the admitting physician.  Any changes in plan, additional imaging/labs, consults, and further work up will be at the discretion of the admitting physician.

## 2025-04-02 NOTE — ED PROVIDER NOTE - OBJECTIVE STATEMENT
Pertinent PMH/PSH/FHx/SHx and Review of Systems contained within:  Patient presents to the ED for multiple complaints.  Patient has daughter with her at bedside.  Comes in for 2 days of intractable nonbloody vomiting and diarrhea.   She endorses pain in abdomen and right sided chest.  Denies sob.  She is s/p right knee replacement with Dr. Reddy in [facility] and complains of medial knee pain, daughter is questioning swelling of the knee.  No measured fevers.  Patient denies urinary complaints.  She received antibiotics at time of admission but was not discharged on them.  She is alert and oriented x4 at bedside, daughter states some disorientation yesterday.      Relevant PMHx/SHx/SOCHx/FAMH:  DM, right knee replacement  Patient denies EtOH/tobacco/illicit substance use.    ROS: No fever/chills, No headache/photophobia/eye pain/changes in vision, No ear pain/sore throat/dysphagia, No palpitations, no SOB/cough/wheeze/stridor, No melena, no dysuria/frequency/discharge, No neck/back pain, no rash, no changes in neurological status/function.

## 2025-04-02 NOTE — ED ADULT TRIAGE NOTE - BSA (M2)
1.93 Keep your intake of vitamin K regular. The highest amount of vitamin K is found in green and leafy vegetables like broccoli, lettuces, cabbage, and spinach. You can eat these foods but keep the portion size the same. Changes in the amount you eat can affect your PT/INR blood test. Contact your doctor before making any major changes in your diet. Limit your alcohol intake.

## 2025-04-02 NOTE — PATIENT PROFILE ADULT - NSPROSPHOSPCHAPLAINYN_GEN_A_NUR
Per Dr. Agbeh, patient received Kyleena IUD on 24 that  in 2024. Patient notified same day of error. Documented in office visit note. Per Dr. Agbeh, would like to see patient sooner for replacement due to reduced hormonal effects with expiration date.   Called patient, lvom requesting call back to schedule appt today or Friday this week in [place] with Dr. Agbeh.   Law Bonner on 2024 at 9:09 AM    no

## 2025-04-02 NOTE — ED PROVIDER NOTE - PHYSICAL EXAMINATION
Gen: Alert, slightly ill appearing  Head: NC, AT, EOMI, normal lids/conjunctiva  ENT: normal hearing, patent oropharynx without erythema/exudate, uvula midline  Neck: +supple, no tenderness/meningismus/JVD, +Trachea midline  Pulm: Bilateral BS, normal resp effort, no wheeze/stridor/retractions  CV: tachycardia, no M/R/G, +dist pulses  Abd: soft, diffusely tender, Negative [place] signs, +BS, no palpable masses  Mskel: right knee with mild swelling, wound vac in place, no erythema or drainage  Skin: no rash, warm/dry  Neuro: AAOx3, no apparent sensory/motor deficits, coordination intact

## 2025-04-02 NOTE — H&P ADULT - HISTORY OF PRESENT ILLNESS
Patient is a 55F, with PMHx of DM, R TKA with Dr Reddy 3/28/25, who comes in with 2 days of intractable nausea, vomiting, and diarrhea. She had at least 7 episodes of diarrhea in one day, and also vomiting. When she vomits she gets R chest pain and shortness of breath. She also complained of having chills last night, with headache, heart palpitation. She also endorses R knee pain since the surgery. Patient denies bloody stools or urine.    Only recent antibiotic use is recently during surgical prophylaxis.

## 2025-04-02 NOTE — ED ADULT NURSE NOTE - NSFALLUNIVINTERV_ED_ALL_ED
Bed/Stretcher in lowest position, wheels locked, appropriate side rails in place/Call bell, personal items and telephone in reach/Instruct patient to call for assistance before getting out of bed/chair/stretcher/Non-slip footwear applied when patient is off stretcher/[place] to call system/Physically safe environment - no spills, clutter or unnecessary equipment/Purposeful proactive rounding/Room/bathroom lighting operational, light cord in reach

## 2025-04-02 NOTE — CONSULT NOTE ADULT - SUBJECTIVE AND OBJECTIVE BOX
55y Female presents with nausea and vomiting sp R TKA with Dr Reddy 3/28/25. States she has been ambulating and progressing well with PT. Complains of some medial sided knee pain that is unchanged since day of surgery. Denies numbness/tingling in the affected extremity. Denies any MF, head strike/LOC/other orthopedic injuries at this time. Patient able to range knee well with minimal discomfort    PAST MEDICAL & SURGICAL HISTORY:  Diabetes      History of cone biopsy of uterine cervix        Home Medications:  acetaminophen 500 mg oral tablet: 2 tab(s) orally every 8 hours As needed Mild Pain (1 - 3) (28 Mar 2025 14:53)  canagliflozin: 1 tab(s) orally once a day (28 Mar 2025 14:53)  gabapentin 300 mg oral capsule: 1 cap(s) orally every 12 hours (28 Mar 2025 14:53)  Glimepiride: 1 tab(s) orally once a day (28 Mar 2025 14:53)  metFORMIN: 1 tab(s) orally once a day (28 Mar 2025 14:53)  Multiple Vitamins oral tablet: 1 tab(s) orally once a day (28 Mar 2025 14:53)  semaglutide: 1 tab(s) orally once a day (28 Mar 2025 14:53)  senna leaf extract oral tablet: 2 tab(s) orally once a day (at bedtime) (28 Mar 2025 14:53)    Allergies    No Known Allergies    Intolerances    Vital Signs Last 24 Hrs  T(C): 36.7 (02 Apr 2025 11:23), Max: 36.7 (02 Apr 2025 11:23)  T(F): 98 (02 Apr 2025 11:23), Max: 98 (02 Apr 2025 11:23)  HR: 127 (02 Apr 2025 11:23) (127 - 127)  BP: 114/73 (02 Apr 2025 11:23) (114/73 - 114/73)  BP(mean): --  RR: 18 (02 Apr 2025 11:23) (18 - 18)  SpO2: 96% (02 Apr 2025 11:23) (96% - 96%)    Parameters below as of 02 Apr 2025 11:23  Patient On (Oxygen Delivery Method): room air        PHYSICAL EXAM  General: NAD, Awake and Alert  RLE:  FLETCHER dressing with strikethrough, unchanged since POD1  Normal postoperative edema  Skin otherwise intact, no abrasions, no erythema  No calf tenderness  Compartments soft and compressible  Motor: + TA/GSC/FHL/EHL  Sensory: +SILT SPN/DPN/TAMIKA/SAPH/TIB  + DP / PT      Assessment/Plan:  55y Female with nausea and vomiting postoperatively sp R TKA with Dr Reddy 3/28/25.    -Keep FLETCHER dressing intact until POD7  -No concerns for any pathology of the R knee sp TKA  -Patient progressing appropriately regarding ROM and ambulation with PT  -Pain control as needed, ice as needed  -C/w postoperative DVT ppx with A81 BID  -C/w PTOT  -WBAT   -Ortho stable for discharge, no acute intervention indicated  -Discussed with Dr Reddy who agrees.

## 2025-04-02 NOTE — H&P ADULT - PROBLEM SELECTOR PLAN 4
- EKG = Sinus tachycardia Minimal voltage criteria for LVH, may be normal variant ( R in aVL )  - TSH Wnl  - Likely due to intractable vomiting and diarrhea. She said she was in the bathroom having diarrhea and had bag over her mouth for vomiting  - S/p 1L IVF in ED  - Will give additional 1L bolus + maintenance IVF  - On telemetry

## 2025-04-02 NOTE — ED ADULT NURSE NOTE - BREATHING, MLM
[FreeTextEntry1] : Coxsackie virus-likely due to viral use tylenol every 4 hours for fever, cool compresses and hydrate with pedialyte. Pt was given rectal tylenol in office and 1.5 ml of benadryl. He stopped crying after 10 minutes and fell asleep. Mom contacted over cell phone to explain illness and what to expect. \par if urine output below 3-4 diapers in 24 hours and fever persistent. All questions answered. Caretaker understands and agrees with plan.\par 
Spontaneous, unlabored and symmetrical

## 2025-04-02 NOTE — H&P ADULT - ASSESSMENT
Patient is a 55F, with PMHx of DM, R TKA with Dr Reddy 3/28/25, who comes in with 2 days of intractable nausea, vomiting, and diarrhea. Admitted for intractable nausea, vomit, diarrhea, in the setting of likely gastroenteritis.

## 2025-04-02 NOTE — ED ADULT NURSE NOTE - OBJECTIVE STATEMENT
55 year old female with PMH of DM. Pt with c/o frequent N/V/D, lightheaded, cool/clammy, palpitations, and intermittent incoherence began 6PM yesterday. Daughter states pt at baseline at this time. R knee replacement here on Friday. Primavac noted in place on right knee, dressing dry and intact, bleeding on dressing will be monitored. Pt last took oxy yesterday at 3pm. Pt endorse SOB and right chest discomfort, o2 sat 100% on RA. Pt placed on cardiac monitor.

## 2025-04-02 NOTE — H&P ADULT - PROBLEM SELECTOR PLAN 6
- Has R knee pain  - S/p recent surgery  - Pain med PRN  - C/w gabapentin, flexeril PRN  - Ortho on board = No concerns for any pathology of the R knee sp TKA

## 2025-04-02 NOTE — ED PROVIDER NOTE - WET READ LAUNCH FT
1/4/2023            Americo Sanchez  [address] [place] [address]          Please review the following information for your upcoming outpatient procedure.                                                                 Thank you for choosing [facility] for your Procedure.        PROVIDER:  Americo Youngblood MD      You are scheduled for a(n) Colonoscopy on 2/3/2023   Arrival time:  9:30 A.M.  Location:   [facility] [place], Entrance B                       [address] [place]  [address]    The following instructions are very important. Please read the entire packet thoroughly and follow the instructions as outlined.  *Please be aware that emergencies do occur which may cause changes in the timing of your procedure. Every attempt will be made to keep you informed of changes as they occur. We appreciate your cooperation with this; please do not hesitate to let us know what can be done to make your stay as pleasant as possible while you wait.  Please Note: You cannot drive yourself home after the procedure.   * A responsible adult (over 18 years old) must drive you home, recommend someone stay with you overnight.  * If you do not have a ride home and/or someone to stay with you overnight your procedure will be delayed or canceled.  * You cannot take public transportation home from your procedure.  You will be at the Surgery Center 2 - 2 1/2 hours from your arrival time to discharge.  CANCELING A PROCEDURE  If it is necessary for you to cancel your procedure, please notify our office at least 2 business days in advance, [phone].  If you fail to show up for your procedure without notification you may need to reschedule your procedure with another provider.    It is extremely important to contact our office if you become positive for Covid at any time prior to your scheduled procedure.     Remove all jewelry, piercing's, dentures, and  partials prior to coming to your procedure.     Purchase the following over-the-counter preparation items selected below:  4 -  dulcolax (bisacodyl) 5mg tablets.  2 -  238 gm bottle(s) of MiraLAX or Polyethylene Glycol (PEG 3350)  2 -  64 oz. bottle(s) of any of these three liquids: Gatorade (NOT if Diabetic), Propel, flavored water (any flavor), or Powerade.  (no red, dark blue or purple)                        7 days before your procedure    Arrange for someone to drive you to/from your procedure and stay with you overnight     If you take blood thinners, contact the prescribing physician for instructions on when to hold (stop) blood thinner medication before this procedure. Contact our office if you cannot stop these medications    If you are a diabetic patient please call the doctor that manages your diabetic medications and let them know that you are prepping for a colonoscopy.  They will advise you on how to adjust your medications during the prep.   5 days before   Morning * STOP anti-coagulants/blood thinners as instructed by prescribing physician.  * STOP ASPIRIN  And all aspirin-containing products  * STOP all NSAIDS  * STOP all herbal supplements, iron supplements, and fish oil  *STOP all diet pills * NSAIDS include ibuprofen, Advil, Motrin, Naprosyn, Aleve, Celebrex, Indocin, and Piroxicam  * You may take Tylenol products  * You may take a multivitamin        2 DAYS PRIOR TO YOUR PROCEDURE: Avoid using fiber supplements and anti-diarrheal medications. Avoid eating fresh vegetables, fruit, salad and any nuts or seeds you may place on a salad.         1 DAY BEFORE YOUR PROCEDURE:  CLEAR LIQUIDS ONLY THE ENTIRE DAY BEFORE YOUR PROCEDURE     NO SOLID FOOD UNTIL AFTER YOUR PROCEDURE.      CONSUME ONLY CLEAR LIQUIDS YOU CAN SEE THROUGH THAT HAVE NO RED DYE OR PULP.      DO NOT CONSUME MILK OR MILK PRODUCTS.      Clear liquids (no red, dark blue or purple) would include: water, clear carbonated soda, flavored  water, sports drinks, coffee (no creamer), tea, fruit ices made without fruit pieces, gelatin, hard candy, popsicles, bouillon, clear broth, clear fruit juices (no pulp).     The day before your procedure: at 10:00 A.M. take the 4 dulcolax (bisacodyl) 5mg tablets.           The day before your procedure: at 12:00 P.M. mix one 238 gm bottle of MiraLAX or the generic, in whichever sports drink you purchased, shake well until the MiraLAX is dissolved. Sip 8 oz. of the mixture every 30 minutes until the solution is GONE. Drinking too quickly may cause nausea. You may want to drink with a straw. Be sure to drink other clear liquids in addition to the mixture. You will have diarrhea. To prevent skin irritation, use baby wipes or apply Vaseline after each bowel movement.        The day before your procedure: at 6:00 P.M. mix one 238 gm bottle of MiraLAX or the generic, in whichever sports drink you purchased, shake well until the MiraLAX is dissolved. Sip 8 oz. of the mixture every 30 minutes until the solution is GONE. Drinking too quickly may cause nausea. You may want to drink with a straw. Be sure to drink other clear liquids in addition to the mixture. You will have diarrhea. To prevent skin irritation, use baby wipes or apply Vaseline after each bowel movement.        Remove all jewelry, piercing's, dentures, and partials prior to coming to your procedure.     DAY OF PROCEDURE  You may take oral blood pressure, seizure, diabetes and breathing medications with a very small sip of water.  All other medications will wait until after the procedure. Clear liquids are okay up to 4 hours before your arrival time at Outpatient Surgery.     If you were told to stop ANTI-COAGULATION or other MEDICATIONS prior to your procedure, check with the doctor, before you are discharged, when to restart these medications.                                                      COLONOSCOPY INSURANCE COVERAGE      Insurance Authorization  Our  referral department will contact your insurance company for prior authorization only. The authorization DOES NOT determine your benefits for the procedure. We strongly encourage you to call your insurance company and ask about your benefits for both screening and diagnostic colonoscopy to determine your out-of-pocket expense.  Procedure: Colonoscopy CPT Code: 22480  Diagnosis Code: Screening for colon cancer:  Z12.11  CALL: [phone] WITH AUTHORIZATION QUESTIONS.  CALL: [phone] TO FINANCIAL [employer]VOCATES FOR COST.     HOW WILL I KNOW WHAT MY OUT OF POCKET EXPENSES WILL BE?   Lauren can provide an estimate of your expenses, please call [phone]. Using your current and past/family medical history, and your insurance plan information, they will be able to mail you an estimate of your out of pocket expenses for a screening and/or diagnostic colonoscopy.           **To help you prepare for your upcoming procedure, you will be sent an Internet Educational Program called [employer]. Instructions will be sent to you via email or phone from [employer].**                   There are no Wet Read(s) to document.

## 2025-04-02 NOTE — PATIENT PROFILE ADULT - FALL HARM RISK - HARM RISK INTERVENTIONS
Assistance with ambulation/Assistance OOB with selected safe patient handling equipment/Communicate Risk of Fall with Harm to all staff/Discuss with provider need for PT consult/Monitor gait and stability/Provide patient with walking aids - walker, cane, crutches/Reinforce activity limits and safety measures with patient and family/Sit up slowly, dangle for a short time, stand at bedside before walking/Tailored Fall Risk Interventions/Use of alarms - bed, chair and/or voice tab/Visual Cue: Yellow wristband and red socks/Bed in lowest position, wheels locked, appropriate side rails in place/Call bell, personal items and telephone in reach/Instruct patient to call for assistance before getting out of bed or chair/Non-slip footwear when patient is out of bed/[place] to call system/Physically safe environment - no spills, clutter or unnecessary equipment/Purposeful Proactive Rounding/Room/bathroom lighting operational, light cord in reach

## 2025-04-02 NOTE — PATIENT PROFILE ADULT - FUNCTIONAL ASSESSMENT - BASIC MOBILITY 4.
HUB TO READ    I CALLED THE PATIENT AND LEFT A VOICEMAIL. THEY NEED TO RESCHEDULE THEIR PHYSICAL APPOINTMENT. DR KEHRER IS NOT GOING TO BE IN THE OFFICE THAT DAY.    PLEASE RESCHEDULE.  
3 = A little assistance

## 2025-04-02 NOTE — ED ADULT TRIAGE NOTE - SPO2 (%)
ASSESSMENT: Marleny Viera is a 49 year old female with past medical history significant for migraine, deafness, asthma, obstructive sleep apnea, fatty liver, obesity, prediabetes, hypertension, history of accidental overdose, de Quervain's disease, CMC DJD, borderline personality disorder, depression, anxiety, ADHD, insomnia, history of PE who presents today for new patient evaluation of acute, severe left low back pain with radiation into the left lower extremity with associated numbness, tingling, and weakness.  Patient has not had any dedicated imaging of the lumbar spine.  Symptoms are concerning for a disc bulge/protrusion with left-sided neural compromise.  Patient is somewhat of a difficult historian.  She describes diffuse symptoms involving the entire left lower extremity.  She also endorses loss of bladder control.  It sounds like this is a chronic issue for her but has been worse over the past 2 weeks.  She endorses numbness on the left side of her groin.  She has not had any loss of bowel control.  Strength testing reveals full strength on the right side.  She has breakaway weakness in all muscle groups of the left side.  I think cauda equina syndrome is unlikely given unilateral systems and preservation of bowel function but we will check a stat MRI lumbar spine given change in bladder function.  A CT abdomen pelvis from February 2024 showed multilevel disc degeneration and facet arthropathy.    PLAN:  A shared decision making model was used.  The patient's values and choices were respected.  The following represents what was discussed and decided upon by the physician assistant and the patient.  A professional  was present for the visit.    1.  DIAGNOSTIC TESTS:  - I reviewed a CT abdomen pelvis from February 2024.  - I ordered a stat MRI lumbar spine for further evaluation.  Patient endorses worsening bladder function and left-sided groin numbness.    2.  PHYSICAL THERAPY: No physical  therapy was ordered we will await the results of her MRI.    3.  MEDICATIONS:  - Valium 5 mg, or 2 with no refills was prescribed for claustrophobia before her MRI.  - Patient completed a Medrol Dosepak.  - Patient can continue Tylenol as needed.  - Patient takes gabapentin for migraines and anxiety.    4.  INTERVENTIONS: No interventions were ordered today.  Will await the results of her MRI.    5.  PATIENT EDUCATION:  - I told the patient that if she develops right-sided pain/numbness/weakness, loss of bowel control, worsening bladder function, or worsening numbness and weakness in the left leg she should proceed immediately to the emergency department.  She voiced understanding to this.  - If the MRI lumbar spine shows severe neural compromise I would recommend a referral to neurosurgery.    6.  FOLLOW-UP:   My nurse will call the patient with the results of her MRI lumbar spine.  If she has questions or concerns in the meantime, she should not hesitate to call.      SUBJECTIVE:  Marleny Viera  Is a 49 year old female who presents today as a referral from the emergency department (April 1, 2024) for new patient evaluation of low back pain with radiation into the left lower extremity associated numbness, tingling, weakness.  Patient reports that pain began 2 weeks ago.  She denies any injury or event to cause the pain but states that she had 6 falls over the winter.  Pain is getting worse.  She has started to require a walker for assistance due to weakness in her leg.  She has also had a change in her bladder function.  Patient reports that she has chronic urinary incontinence.  She wears depends at night for this but can wear regular briefs during the day.  However, since this pain began she has had worsening of her bladder function.  She states that she cannot feel the sensation of having a full bladder so she leaks before she is able to reach the toilet.  She also reports numbness on the left side of her  groin/genital region.  She has not had any loss of bowel control.  She has full sensation of the right side of her genital region.    Patient complains of left-sided low back pain.  Pain radiates into the left buttock.  She describes pain that radiates all the way down the left leg to the foot involving the entire left leg.  She has numbness and tingling in the same area as her pain.  She states her left foot feels cold.  She feels weakness in the left leg.  She denies fevers.  She rates her pain today as an 8 out of 10.  At its worst it is an 8 out of 10.  At its best it is an 8 out of 10.  Pain is aggravated with walking.  Pain is alleviated with applying ice and heat.    Treatment to date:  - Medrol Dosepak  - Tizanidine  - Tylenol  - Gabapentin  - No physical therapy for low back pain  - No lumbar spine surgeries  - No lumbar spine injections.    Current Outpatient Medications   Medication Sig Dispense Refill    acetaminophen (TYLENOL) 325 MG tablet Take 325-650 mg by mouth every 6 hours as needed for mild pain      albuterol (PROAIR HFA/PROVENTIL HFA/VENTOLIN HFA) 108 (90 Base) MCG/ACT inhaler INHALE 2 PUFFS BY MOUTH EVERY FOUR HOURS AS NEEDED FOR WHEEZING OR SHORTNESS OF BREATH 8.5 g 1    albuterol (PROVENTIL) (2.5 MG/3ML) 0.083% neb solution USE 1 VIAL NEBULIZER FOUR TIMES PER WEEK 180 mL 11    cetirizine (ZYRTEC) 10 MG tablet TAKE 1 TABLET (10 MG) BY MOUTH DAILY 30 tablet 8    citalopram (CELEXA) 20 MG tablet       diphenhydrAMINE (BENADRYL) 50 MG capsule Take 50 mg by mouth      EPINEPHrine (ANY BX GENERIC EQUIV) 0.3 MG/0.3ML injection 2-pack INJECT 0.3 MLS (0.3 MG) INTO THE MUSCLE AS NEEDED FOR ANAPHYLAXIS 2 each 1    estradiol (VIVELLE-DOT) 0.05 MG/24HR bi-weekly patch 1 application to skin Transdermal Two times a Week for 90 days      famotidine (PEPCID) 20 MG tablet Take 1 tablet (20 mg) by mouth 2 times daily 30 tablet 0    gabapentin (NEURONTIN) 100 MG capsule TAKE 1 CAPSULE BY MOUTH TWICE A DAY FOR  MIGRAINES AND ANXIETY 180 capsule 3    hydrOXYzine HCl (ATARAX) 25 MG tablet TAKE 3 TABLETS BY MOUTH THREE TIMES A DAY AS NEEDED FOR ANXIETY OR INSOMNIA . 168 tablet 0    losartan (COZAAR) 25 MG tablet Take 1 tablet (25 mg) by mouth daily 90 tablet 3    methylPREDNISolone (MEDROL DOSEPAK) 4 MG tablet therapy pack Follow Package Directions 21 tablet 0    metoprolol succinate ER (TOPROL XL) 50 MG 24 hr tablet Take 1 tablet (50 mg) by mouth daily 90 tablet 3    montelukast (SINGULAIR) 10 MG tablet Take 1 tablet (10 mg) by mouth At Bedtime 90 tablet 3    NONFORMULARY Nerve vitamin      omeprazole (PRILOSEC) 20 MG DR capsule Take 1 capsule (20 mg) by mouth daily Take once daily in the morning 30 minutes prior to food and drink. 30 capsule 0    polyethylene glycol (MIRALAX) 17 GM/Dose powder Take 17 g (1 capful) by mouth daily 527 g 0    prazosin (MINIPRESS) 1 MG capsule       PROMETRIUM 200 MG capsule 1 cap Orally nightly for 90 days      QUEtiapine (SEROQUEL) 50 MG tablet       rizatriptan (MAXALT) 10 MG tablet Take 1 tablet (10 mg) by mouth at onset of headache for migraine 18 tablet 2    SYMBICORT 80-4.5 MCG/ACT Inhaler Inhale 2 puffs into the lungs 2 times daily 10.2 g 11    terbinafine (LAMISIL) 1 % external cream Apply topically 2 times daily 30 g 2    tiZANidine (ZANAFLEX) 4 MG tablet Take 1 tablet (4 mg) by mouth 3 times daily as needed for muscle spasms 20 tablet 0     Current Facility-Administered Medications   Medication Dose Route Frequency Provider Last Rate Last Admin    cyanocobalamin injection 1,000 mcg  1,000 mcg Intramuscular Q30 Days Maria Fernanda Schumacher, PEBBLES           Allergies   Allergen Reactions    Acetylcysteine Rash and Other (See Comments)    Amoxicillin Itching and Shortness Of Breath    Bee Pollen Anaphylaxis    Contrast [Iodixanol] Shortness Of Breath and Rash     Pt stated she reacted to the contrast given for her CT in past. She experienced shortness of breath, throat tightening, and rash on  chest, and they gave her an injection to counter the symptoms.     Covid-19 (Mrna) Vaccine Shortness Of Breath     Pfizer COVID-19 Vaccine    Hymenoptera Allergenic Extract [Wasp Venom Protein] Anaphylaxis    Iodine Hives and Unknown     Pt stated that she was injected with CT CONTRAST in the past and got hives, SOB, and nausea. Please pre-medicate patient in the future.     Wasp Venom Protein Starter Kit [Wasp Venom Protein] Itching, Shortness Of Breath, Swelling and Difficulty breathing    Adhesive Tape Unknown    Aspirin     Bee Venom Unknown    Food Allergy Formula Unknown     Red meat, chocolate    Geodon [Ziprasidone] Unknown    Latex Unknown     Added based on information entered during case entry, please review and add reactions, type, and severity as needed    Morphine Unknown    Prochlorperazine Other (See Comments)    Risperdal [Risperidone] Unknown    Risperidone Analogues [Risperidone] Other (See Comments)    Shellfish Allergy Unknown    Theobroma Oil [Theobroma Grandiflorum Seed Butter] Unknown    Trazodone Other (See Comments)     Elevated creatinine    Zoloft [Sertraline] Unknown    Hydrochlorothiazide Rash       Past Medical History:   Diagnosis Date    Accidental overdose, initial encounter 7/7/2022    ADHD (attention deficit hyperactivity disorder)     Alcohol dependence (H)     Anxiety     Arthritis     Asthma     Bipolar depression (H)     Deafness     Drug abuse, nondependent (H) 10/29/2004    Overview:  polydrug abuse per psych notes ; Other, mixed, or unspecified nondependent drug abuse, unspecified    History of blood clots     History of transfusion     Hypertension     Kidney stones 2015    Major depressive disorder, recurrent episode, severe (H) 5/17/2016    Overview:  s/p suicide attempt Epic     Migraine     Obstructive sleep apnea 6/1/2016    Pulmonary emboli (H)     Status post total right knee replacement 1/18/2016        Patient Active Problem List   Diagnosis    Primary  hypertension    Insomnia    Asthma, moderate persistent, uncomplicated    Borderline personality disorder (H)    Uterine leiomyoma, unspecified location    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    Didelphic uterus    Personal history of PE (pulmonary embolism)    Migraine    Fatty liver    CMC DJD(carpometacarpal degenerative joint disease), localized primary    Obesity (BMI 35.0-39.9) with comorbidity (H)    Other dysphagia    Bilateral deafness    Lumbar back sprain, initial encounter    Prediabetes    Obstructive sleep apnea syndrome    Arthritis of carpometacarpal (CMC) joint of right thumb    Accidental overdose, initial encounter    Severe depression (H)    Anxiety, generalized    Asthma, persistent controlled    Attention deficit hyperactivity disorder (ADHD)    De Quervain's disease (radial styloid tenosynovitis)    Status post total right knee replacement    Toxic effect of acetaminophen, accidental or unintentional, subsequent encounter    LLQ abdominal pain    Allergic reaction, subsequent encounter       Past Surgical History:   Procedure Laterality Date    BIOPSY BREAST Right     2019... Also had fluid drained from left breast under surgery also in 2019    BREAST SURGERY      MAMMOPLASTY REDUCTION Bilateral 2017    TOTAL HIP ARTHROPLASTY Bilateral     TOTAL KNEE ARTHROPLASTY Right     TUBAL LIGATION      US BREAST CORE BIOPSY RIGHT Right 11/21/2019       Family History   Problem Relation Age of Onset    Cancer Mother     Breast Cancer Mother     Cerebrovascular Disease Father        Review of systems: Positive for numbness/tingling, weakness, loss of bladder control, wetting pants, inability to urinate, pain much worse in neck, trip/stumble/falls, difficulty swallowing.  Negative for loss of bowel control, headache, difficulty with hand skills, fevers, unintentional weight loss.      OBJECTIVE:  PHYSICAL EXAMINATION:    CONSTITUTIONAL:  Vital signs as above.  No  acute distress.  The patient is well nourished and well groomed.  Patient is deaf.  PSYCHIATRIC:  The patient is awake, alert, oriented to person, place, time and answering questions appropriately with clear speech.    HEENT: Normocephalic, atraumatic.  Sclera clear.  Neck is supple.  SKIN:  Skin over the face, bilateral lower extremities, and posterior torso is clean, dry, intact without rashes.    GAIT:  Gait is antalgic, favoring the left.  She uses a 4 wheeled walker for assistance.    STANDING EXAMINATION: Tender to palpation left lower lumbar paraspinous muscles.  MUSCLE STRENGTH:  The patient has breakaway weakness in all muscle groups of the left side.  5/5 strength for the right hip flexors, knee flexors/extensors, ankle dorsiflexors/plantar flexors, great toe extensors, ankle evertors/invertors.  NEUROLOGICAL: 2+ patellar, and trace achilles reflexes bilaterally.  Negative Babinski's bilaterally.  No ankle clonus bilaterally.  Diminished sensation left lower extremity throughout.  MUSCULOSKELETAL: Straight leg raise positive on the left, negative on the right.    RESULTS: I reviewed a CT abdomen pelvis from February 8, 2024.  This shows multilevel disc degeneration and facet arthropathy.     96

## 2025-04-02 NOTE — H&P ADULT - NSHPPHYSICALEXAM_GEN_ALL_CORE
GENERAL: NAD  HEAD: Atraumatic, Normocephalic  EYES: EOMI. Conjunctiva and sclera clear  NECK: Supple  CHEST/LUNG: Clear to auscultation bilaterally; No wheeze, rhonchi, rales  HEART: Regular rate and rhythm; No murmurs  ABDOMEN: Soft, Nontender, Nondistended; Bowel sounds present  EXTREMITIES: R knee with splint. No edema.  NEURO: AAOx3

## 2025-04-02 NOTE — H&P ADULT - PROBLEM SELECTOR PLAN 1
- P/w nausea, vomiting, and diarrhea for 2 days  - Not worsening. Instead, improving already  - CTA C/A/P = No evidence of pulmonary embolism. No evidence of acute inflammatory or obstructive process in the abdomen and pelvis.  - Lactate 1.4  - SIRS criteria not met  - Likely gastroenteritis, viral  - Will give PPI for now  - CLD for now. Advance as tolerated  - Maintenance IVF  - F/u GI PCR, Stool cultures, Stool O&P, C diff(low suspicion but will send due to recent antibiotic use) - P/w nausea, vomiting, and diarrhea for 2 days  - Not worsening. Instead, improving already  - CTA C/A/P = No evidence of pulmonary embolism. No evidence of acute inflammatory or obstructive process in the abdomen and pelvis.  - Lactate 1.4  - UTox negative  - SIRS criteria not met  - Likely gastroenteritis, viral  - Will give PPI for now  - CLD for now. Advance as tolerated  - Maintenance IVF  - F/u GI PCR, Stool cultures, Stool O&P, C diff(low suspicion but will send due to recent antibiotic use) - P/w nausea, vomiting, and diarrhea for 2 days  - Not worsening. Instead, improving already  - CTA C/A/P = No evidence of pulmonary embolism. No evidence of acute inflammatory or obstructive process in the abdomen and pelvis.  - Lactate 1.4  - UTox negative  - SIRS criteria not met  - Likely gastroenteritis, viral  - Will give PPI for now  - CLD for now. Advance as tolerated  - Maintenance IVF  - Hold off antibiotics for now, as patient is already improving. Can start if patient's diarrhea worsens. Pt agrees with the plan.  - F/u GI PCR, Stool cultures, Stool O&P, C diff(low suspicion but will send due to recent antibiotic use)

## 2025-04-03 DIAGNOSIS — E11.9 TYPE 2 DIABETES MELLITUS WITHOUT COMPLICATIONS: ICD-10-CM

## 2025-04-03 DIAGNOSIS — Z79.84 LONG TERM (CURRENT) USE OF ORAL HYPOGLYCEMIC DRUGS: ICD-10-CM

## 2025-04-03 DIAGNOSIS — M17.11 UNILATERAL PRIMARY OSTEOARTHRITIS, RIGHT KNEE: ICD-10-CM

## 2025-04-03 DIAGNOSIS — Z79.85 LONG-TERM (CURRENT) USE OF INJECTABLE NON-INSULIN ANTIDIABETIC DRUGS: ICD-10-CM

## 2025-04-03 LAB
ALBUMIN SERPL ELPH-MCNC: 2.9 G/DL — LOW (ref 3.3–5)
ALP SERPL-CCNC: 70 U/L — SIGNIFICANT CHANGE UP (ref 40–120)
ALT FLD-CCNC: 18 U/L — SIGNIFICANT CHANGE UP (ref 12–78)
ANION GAP SERPL CALC-SCNC: 5 MMOL/L — SIGNIFICANT CHANGE UP (ref 5–17)
AST SERPL-CCNC: 11 U/L — LOW (ref 15–37)
BILIRUB SERPL-MCNC: 0.6 MG/DL — SIGNIFICANT CHANGE UP (ref 0.2–1.2)
BUN SERPL-MCNC: 13 MG/DL — SIGNIFICANT CHANGE UP (ref 7–23)
CALCIUM SERPL-MCNC: 8.8 MG/DL — SIGNIFICANT CHANGE UP (ref 8.5–10.1)
CHLORIDE SERPL-SCNC: 107 MMOL/L — SIGNIFICANT CHANGE UP (ref 96–108)
CO2 SERPL-SCNC: 26 MMOL/L — SIGNIFICANT CHANGE UP (ref 22–31)
CREAT SERPL-MCNC: 0.61 MG/DL — SIGNIFICANT CHANGE UP (ref 0.5–1.3)
CULTURE RESULTS: SIGNIFICANT CHANGE UP
EGFR: 105 ML/MIN/1.73M2 — SIGNIFICANT CHANGE UP
EGFR: 105 ML/MIN/1.73M2 — SIGNIFICANT CHANGE UP
GLUCOSE BLDC GLUCOMTR-MCNC: 137 MG/DL — HIGH (ref 70–99)
GLUCOSE BLDC GLUCOMTR-MCNC: 140 MG/DL — HIGH (ref 70–99)
GLUCOSE BLDC GLUCOMTR-MCNC: 161 MG/DL — HIGH (ref 70–99)
GLUCOSE BLDC GLUCOMTR-MCNC: 187 MG/DL — HIGH (ref 70–99)
GLUCOSE SERPL-MCNC: 140 MG/DL — HIGH (ref 70–99)
HCT VFR BLD CALC: 31.6 % — LOW (ref 34.5–45)
HGB BLD-MCNC: 10.4 G/DL — LOW (ref 11.5–15.5)
MAGNESIUM SERPL-MCNC: 1.7 MG/DL — SIGNIFICANT CHANGE UP (ref 1.6–2.6)
MCHC RBC-ENTMCNC: 31 PG — SIGNIFICANT CHANGE UP (ref 27–34)
MCHC RBC-ENTMCNC: 32.9 G/DL — SIGNIFICANT CHANGE UP (ref 32–36)
MCV RBC AUTO: 94 FL — SIGNIFICANT CHANGE UP (ref 80–100)
NRBC BLD AUTO-RTO: 0 /100 WBCS — SIGNIFICANT CHANGE UP (ref 0–0)
PHOSPHATE SERPL-MCNC: 1.7 MG/DL — LOW (ref 2.5–4.5)
PLATELET # BLD AUTO: 293 K/UL — SIGNIFICANT CHANGE UP (ref 150–400)
POTASSIUM SERPL-MCNC: 3.7 MMOL/L — SIGNIFICANT CHANGE UP (ref 3.5–5.3)
POTASSIUM SERPL-SCNC: 3.7 MMOL/L — SIGNIFICANT CHANGE UP (ref 3.5–5.3)
PROT SERPL-MCNC: 7 GM/DL — SIGNIFICANT CHANGE UP (ref 6–8.3)
RBC # BLD: 3.36 M/UL — LOW (ref 3.8–5.2)
RBC # FLD: 12.7 % — SIGNIFICANT CHANGE UP (ref 10.3–14.5)
SODIUM SERPL-SCNC: 138 MMOL/L — SIGNIFICANT CHANGE UP (ref 135–145)
SPECIMEN SOURCE: SIGNIFICANT CHANGE UP
TSH SERPL-MCNC: 2.28 UIU/ML — SIGNIFICANT CHANGE UP (ref 0.36–3.74)
WBC # BLD: 4.6 K/UL — SIGNIFICANT CHANGE UP (ref 3.8–10.5)
WBC # FLD AUTO: 4.6 K/UL — SIGNIFICANT CHANGE UP (ref 3.8–10.5)

## 2025-04-03 PROCEDURE — 99232 SBSQ HOSP IP/OBS MODERATE 35: CPT

## 2025-04-03 RX ORDER — SOD PHOS DI, MONO/K PHOS MONO 250 MG
1 TABLET ORAL ONCE
Refills: 0 | Status: COMPLETED | OUTPATIENT
Start: 2025-04-03 | End: 2025-04-03

## 2025-04-03 RX ADMIN — Medication 650 MILLIGRAM(S): at 14:33

## 2025-04-03 RX ADMIN — Medication 1 PACKET(S): at 10:46

## 2025-04-03 RX ADMIN — OXYCODONE HYDROCHLORIDE 10 MILLIGRAM(S): 30 TABLET ORAL at 22:57

## 2025-04-03 RX ADMIN — Medication 81 MILLIGRAM(S): at 05:47

## 2025-04-03 RX ADMIN — CYCLOBENZAPRINE HYDROCHLORIDE 5 MILLIGRAM(S): 15 CAPSULE, EXTENDED RELEASE ORAL at 05:48

## 2025-04-03 RX ADMIN — GABAPENTIN 300 MILLIGRAM(S): 400 CAPSULE ORAL at 05:48

## 2025-04-03 RX ADMIN — CELECOXIB 200 MILLIGRAM(S): 50 CAPSULE ORAL at 05:48

## 2025-04-03 RX ADMIN — GABAPENTIN 300 MILLIGRAM(S): 400 CAPSULE ORAL at 17:10

## 2025-04-03 RX ADMIN — CYCLOBENZAPRINE HYDROCHLORIDE 5 MILLIGRAM(S): 15 CAPSULE, EXTENDED RELEASE ORAL at 17:09

## 2025-04-03 RX ADMIN — OXYCODONE HYDROCHLORIDE 10 MILLIGRAM(S): 30 TABLET ORAL at 23:28

## 2025-04-03 RX ADMIN — OXYCODONE HYDROCHLORIDE 10 MILLIGRAM(S): 30 TABLET ORAL at 17:04

## 2025-04-03 RX ADMIN — Medication 40 MILLIGRAM(S): at 05:47

## 2025-04-03 RX ADMIN — Medication 650 MILLIGRAM(S): at 13:30

## 2025-04-03 RX ADMIN — OXYCODONE HYDROCHLORIDE 10 MILLIGRAM(S): 30 TABLET ORAL at 16:32

## 2025-04-03 RX ADMIN — OXYCODONE HYDROCHLORIDE 10 MILLIGRAM(S): 30 TABLET ORAL at 02:22

## 2025-04-03 RX ADMIN — INSULIN LISPRO 1: 100 INJECTION, SOLUTION INTRAVENOUS; SUBCUTANEOUS at 16:33

## 2025-04-03 RX ADMIN — Medication 81 MILLIGRAM(S): at 17:09

## 2025-04-03 NOTE — PROGRESS NOTE ADULT - SUBJECTIVE AND OBJECTIVE BOX
Patient is a 55y old  Female who presents with a chief complaint of Intractable nausea, vomiting, diarrhea (02 Apr 2025 17:50)      INTERVAL HPI/OVERNIGHT EVENTS:  -afebrile vitals stable   -seen and examined at bedside, daughter updated   -no longer having having diarrhea wants to try advancing diet     MEDICATIONS  (STANDING):  aspirin enteric coated 81 milliGRAM(s) Oral two times a day  dextrose 50% Injectable 50 Gram(s) IV Push once  gabapentin 300 milliGRAM(s) Oral every 12 hours  glucagon  Injectable 1 milliGRAM(s) IntraMuscular once  insulin lispro (ADMELOG) corrective regimen sliding scale   SubCutaneous three times a day before meals  insulin lispro (ADMELOG) corrective regimen sliding scale   SubCutaneous at bedtime  lactated ringers. 1000 milliLiter(s) (100 mL/Hr) IV Continuous <Continuous>  pantoprazole    Tablet 40 milliGRAM(s) Oral before breakfast    MEDICATIONS  (PRN):  acetaminophen     Tablet .. 650 milliGRAM(s) Oral every 6 hours PRN Mild Pain (1 - 3), Moderate Pain (4 - 6)  aluminum hydroxide/magnesium hydroxide/simethicone Suspension 30 milliLiter(s) Oral every 4 hours PRN Dyspepsia  celecoxib 200 milliGRAM(s) Oral every 12 hours PRN Moderate Pain (4 - 6)  cyclobenzaprine 5 milliGRAM(s) Oral three times a day PRN Muscle Spasm  melatonin 3 milliGRAM(s) Oral at bedtime PRN Insomnia  ondansetron Injectable 4 milliGRAM(s) IV Push every 6 hours PRN Nausea and/or Vomiting  oxyCODONE    IR 10 milliGRAM(s) Oral every 6 hours PRN Severe Pain (7 - 10)      Allergies    No Known Allergies    Intolerances        REVIEW OF SYSTEMS:  no abdominal pain n/v/d no LE edema CP    Vital Signs Last 24 Hrs  T(C): 36.9 (03 Apr 2025 15:21), Max: 37.1 (02 Apr 2025 22:26)  T(F): 98.4 (03 Apr 2025 15:21), Max: 98.8 (02 Apr 2025 22:26)  HR: 109 (03 Apr 2025 15:21) (93 - 109)  BP: 121/64 (03 Apr 2025 15:21) (112/63 - 128/77)  BP(mean): --  RR: 18 (03 Apr 2025 15:21) (18 - 19)  SpO2: 97% (03 Apr 2025 15:21) (95% - 99%)    Parameters below as of 03 Apr 2025 15:21  Patient On (Oxygen Delivery Method): room air        PHYSICAL EXAM:  GENERAL: NAD, well-groomed, well-developed  HEAD:  Atraumatic, Normocephalic  EYES: EOMI, sclera non-icteric  ENMT:  Moist mucous membranes  NERVOUS SYSTEM:  Alert & Oriented X3, Good concentration  CHEST/LUNG: Clear to percussion bilaterally;   HEART: Regular rate and rhythm;  ABDOMEN: Soft, Nontender, Nondistended  EXTREMITIES:  no Le edema   SKIN: warm, dry       LABS:                        10.4   4.60  )-----------( 293      ( 03 Apr 2025 08:25 )             31.6     04-03    138  |  107  |  13  ----------------------------<  140[H]  3.7   |  26  |  0.61    Ca    8.8      03 Apr 2025 08:25  Phos  1.7     04-03  Mg     1.7     04-03    TPro  7.0  /  Alb  2.9[L]  /  TBili  0.6  /  DBili  x   /  AST  11[L]  /  ALT  18  /  AlkPhos  70  04-03      Urinalysis Basic - ( 03 Apr 2025 08:25 )    Color: x / Appearance: x / SG: x / pH: x  Gluc: 140 mg/dL / Ketone: x  / Bili: x / Urobili: x   Blood: x / Protein: x / Nitrite: x   Leuk Esterase: x / RBC: x / WBC x   Sq Epi: x / Non Sq Epi: x / Bacteria: x      CAPILLARY BLOOD GLUCOSE      POCT Blood Glucose.: 187 mg/dL (03 Apr 2025 16:23)  POCT Blood Glucose.: 137 mg/dL (03 Apr 2025 10:55)  POCT Blood Glucose.: 140 mg/dL (03 Apr 2025 08:00)  POCT Blood Glucose.: 127 mg/dL (02 Apr 2025 21:36)  POCT Blood Glucose.: 111 mg/dL (02 Apr 2025 18:15)      RADIOLOGY & ADDITIONAL TESTS:    Imaging Personally Reviewed:  [ X] YES  [ ] NO    Consultant(s) Notes Reviewed:  [ X] YES  [ ] NO    Care Discussed with Consultants/Other Providers [X ] YES  [ ] NO

## 2025-04-03 NOTE — PROGRESS NOTE ADULT - PROBLEM SELECTOR PLAN 1
- P/w nausea, vomiting, and diarrhea for 2 days  - Not worsening. Instead, improving already  - CTA C/A/P = No evidence of pulmonary embolism. No evidence of acute inflammatory or obstructive process in the abdomen and pelvis.  - Lactate 1.4  - UTox negative  - SIRS criteria not met  - Likely gastroenteritis, viral  - Will give PPI for now  - CLD for now. Advance as tolerated-->will advance to solid diet today and see if patient tolerates   - Maintenance IVF  - Hold off antibiotics for now, as patient is already improving. Can start if patient's diarrhea worsens. Pt agrees with the plan.  - F/u GI PCR, Stool cultures, Stool O&P, C diff(low suspicion but will send due to recent antibiotic use)-->not yet collected as patient has not had BM

## 2025-04-04 ENCOUNTER — TRANSCRIPTION ENCOUNTER (OUTPATIENT)
Age: 55
End: 2025-04-04

## 2025-04-04 VITALS — HEART RATE: 97 BPM

## 2025-04-04 LAB
GLUCOSE BLDC GLUCOMTR-MCNC: 135 MG/DL — HIGH (ref 70–99)
GLUCOSE BLDC GLUCOMTR-MCNC: 204 MG/DL — HIGH (ref 70–99)

## 2025-04-04 PROCEDURE — 99239 HOSP IP/OBS DSCHRG MGMT >30: CPT

## 2025-04-04 RX ORDER — CELECOXIB 50 MG/1
1 CAPSULE ORAL
Qty: 10 | Refills: 0
Start: 2025-04-04 | End: 2025-04-08

## 2025-04-04 RX ORDER — CYCLOBENZAPRINE HYDROCHLORIDE 15 MG/1
1 CAPSULE, EXTENDED RELEASE ORAL
Qty: 15 | Refills: 0
Start: 2025-04-04 | End: 2025-04-08

## 2025-04-04 RX ORDER — ACETAMINOPHEN 500 MG/5ML
2 LIQUID (ML) ORAL
Qty: 0 | Refills: 0 | DISCHARGE
Start: 2025-04-04

## 2025-04-04 RX ORDER — ONDANSETRON HCL/PF 4 MG/2 ML
1 VIAL (ML) INJECTION
Qty: 20 | Refills: 0
Start: 2025-04-04 | End: 2025-04-08

## 2025-04-04 RX ADMIN — Medication 40 MILLIGRAM(S): at 07:04

## 2025-04-04 RX ADMIN — OXYCODONE HYDROCHLORIDE 10 MILLIGRAM(S): 30 TABLET ORAL at 06:58

## 2025-04-04 RX ADMIN — Medication 650 MILLIGRAM(S): at 11:37

## 2025-04-04 RX ADMIN — GABAPENTIN 300 MILLIGRAM(S): 400 CAPSULE ORAL at 07:04

## 2025-04-04 RX ADMIN — Medication 81 MILLIGRAM(S): at 07:04

## 2025-04-04 RX ADMIN — INSULIN LISPRO 2: 100 INJECTION, SOLUTION INTRAVENOUS; SUBCUTANEOUS at 11:29

## 2025-04-04 RX ADMIN — SODIUM CHLORIDE 100 MILLILITER(S): 9 INJECTION, SOLUTION INTRAVENOUS at 06:58

## 2025-04-04 RX ADMIN — CELECOXIB 200 MILLIGRAM(S): 50 CAPSULE ORAL at 14:53

## 2025-04-04 NOTE — DISCHARGE NOTE NURSING/CASE MANAGEMENT/SOCIAL WORK - FINANCIAL ASSISTANCE
[facility] provides services at a reduced cost to those who are determined to be eligible through [facility]’s financial assistance program. Information regarding [facility]’s financial assistance program can be found by going to [email or web address] or by calling [phone].

## 2025-04-04 NOTE — DISCHARGE NOTE PROVIDER - CARE PROVIDER_API CALL
Angella Victor Weaver  Internal Medicine  [address] [place] [address]  Phone: [phone]  Fax: [phone]  Established Patient  Follow Up Time: 1 week

## 2025-04-04 NOTE — DISCHARGE NOTE NURSING/CASE MANAGEMENT/SOCIAL WORK - PATIENT PORTAL LINK FT
You can access the FollowMyHealth Patient Portal offered by [facility] by registering at the following website: [email or web address]. By joining [employer] [facility]’s FollowMyHealth portal, you will also be able to view your health information using other applications (apps) compatible with our system.

## 2025-04-04 NOTE — DISCHARGE NOTE PROVIDER - ATTENDING DISCHARGE PHYSICAL EXAMINATION:
Vital Signs Last 24 Hrs  T(C): 36.6 (04 Apr 2025 10:51), Max: 36.9 (03 Apr 2025 15:21)  T(F): 97.9 (04 Apr 2025 10:51), Max: 98.4 (03 Apr 2025 15:21)  HR: 108 (04 Apr 2025 10:51) (85 - 109)  BP: 113/59 (04 Apr 2025 10:51) (106/65 - 121/77)  BP(mean): --  RR: 18 (04 Apr 2025 10:51) (18 - 18)  SpO2: 99% (04 Apr 2025 10:51) (91% - 99%)    Parameters below as of 04 Apr 2025 10:51  Patient On (Oxygen Delivery Method): room air    GENERAL: NAD, well-groomed, well-developed  HEAD:  Atraumatic, Normocephalic  EYES: EOMI, sclera non-icteric  ENMT:  Moist mucous membranes  NERVOUS SYSTEM:  Alert & Oriented X3, Good concentration  CHEST/LUNG: Clear to percussion bilaterally;   HEART: Regular rate and rhythm;  ABDOMEN: Soft, Nontender, Nondistended  EXTREMITIES:  no Le edema   SKIN: warm, dry

## 2025-04-04 NOTE — DISCHARGE NOTE PROVIDER - HOSPITAL COURSE
55F, with PMHx of DM, R TKA with Dr Reddy 3/28/25, who comes in with 2 days of intractable nausea, vomiting, and diarrhea. Admitted for intractable nausea, vomit, diarrhea, in the setting of likely gastroenteritis.      Problem/Plan - 1:  ·  Problem: Intractable nausea and vomiting.   ·  Plan: - P/w nausea, vomiting, and diarrhea for 2 days  - Not worsening. Instead, improving already  - CTA C/A/P = No evidence of pulmonary embolism. No evidence of acute inflammatory or obstructive process in the abdomen and pelvis.  - Lactate 1.4  - UTox negative  - SIRS criteria not met  - Likely gastroenteritis, viral  - Will give PPI for now  - CLD for now. Advance as tolerated-->will advance to solid diet today and see if patient tolerates   - Maintenance IVF  - Hold off antibiotics for now, as patient is already improving. Can start if patient's diarrhea worsens. Pt agrees with the plan.  - F/u GI PCR, Stool cultures, Stool O&P, C diff(low suspicion but will send due to recent antibiotic use)-->not yet collected as patient has not had BM.    Problem/Plan - 2:  ·  Problem: Intractable diarrhea.   ·  Plan: - As above.    Problem/Plan - 3:  ·  Problem: Gastroenteritis.   ·  Plan: - As above.    Problem/Plan - 4:  ·  Problem: Sinus tachycardia.   ·  Plan: - EKG = Sinus tachycardia Minimal voltage criteria for LVH, may be normal variant ( R in aVL )  - TSH Wnl  - Likely due to intractable vomiting and diarrhea. She said she was in the bathroom having diarrhea and had bag over her mouth for vomiting  - S/p 1L IVF in ED  - Will give additional 1L bolus + maintenance IVF  - On telemetry.    Problem/Plan - 5:  ·  Problem: DM (diabetes mellitus).   ·  Plan: - Home meds Invokana, metformin, glimepiride, Ozempic  - ISS + FS achs.    Problem/Plan - 6:  ·  Problem: History of right knee joint replacement.   ·  Plan: - Has R knee pain  - S/p recent surgery  - Pain med PRN  - C/w gabapentin, flexeril PRN  - Ortho on board = No concerns for any pathology of the R knee sp TKA.    Problem/Plan - 7:  ·  Problem: Prophylactic measure.   ·  Plan: - ASA 81mg BID  - PPI for vomiting and prophylaxis for ASA.    On 4/4/25 this case was reviewed with Dr. Baez, the patient is medically stable and optimized for discharge as per attending. All medications were reviewed and prescriptions were sent to mutually agreed upon pharmacy. Discharge plan reviewed with patient and/or family.   HPI:  Patient is a 55F, with PMHx of DM, R TKA with Dr Reddy 3/28/25, who comes in with 2 days of intractable nausea, vomiting, and diarrhea. She had at least 7 episodes of diarrhea in one day, and also vomiting. When she vomits she gets R chest pain and shortness of breath. She also complained of having chills last night, with headache, heart palpitation. She also endorses R knee pain since the surgery. Patient denies bloody stools or urine.    Only recent antibiotic use is recently during surgical prophylaxis.    Hospital course:  The patient presented with intractable nausea, vomiting, and diarrhea. Symptoms began improving during her hospital stay. A CTA of the chest/abdomen/pelvis was negative for pulmonary embolism and acute inflammatory or obstructive processes. Lactate was 1.4 mmol/L. Urine toxicology screen was negative. The patient did not meet SIRS criteria. Gastroenteritis, likely viral, was the presumed diagnosis. She was given a PPI. She was initially placed on a clear liquid diet and advanced to a regular diet, which she tolerated well. IV fluids were discontinued as the patient tolerated oral intake. Antibiotics were held as the patient improved, and ultimately were not needed. Gastrointestinal PCR, stool cultures, stool ova and parasites, and C. difficile toxin assay were ordered but not yet collected as the patient’s stools were formed during the hospital stay.    Problem/Plan - 1:  ·  Problem: Intractable nausea and vomiting.   ·  Plan: - P/w nausea, vomiting, and diarrhea for 2 days  - Not worsening. Instead, improving already  - CTA C/A/P = No evidence of pulmonary embolism. No evidence of acute inflammatory or obstructive process in the abdomen and pelvis.  - Lactate 1.4  - UTox negative  - SIRS criteria not met  - Likely gastroenteritis, viral  - Will give PPI for now  - Initially on clear liquid diet but advanced to regular and tolerated well without nausea or vomiting   - Maintenance IVF were discontinued as patient tolerated PO  - Hold off antibiotics for now, as patient is already improving. Can start if patient's diarrhea worsens. Pt agrees with the plan. Did not require antibiotics this hospital stay as symptoms resolved.  - F/u GI PCR, Stool cultures, Stool O&P, C diff(low suspicion but will send due to recent antibiotic use)-->not yet collected as patient is having formed stools.     Problem/Plan - 2:  ·  Problem: Intractable diarrhea.   ·  Plan: - As above.    Problem/Plan - 3:  ·  Problem: Gastroenteritis.   ·  Plan: - As above.    Problem/Plan - 4:  ·  Problem: Sinus tachycardia.   ·  Plan: - EKG = Sinus tachycardia Minimal voltage criteria for LVH, may be normal variant ( R in aVL )  - TSH Wnl  - Likely due to intractable vomiting and diarrhea. She said she was in the bathroom having diarrhea and had bag over her mouth for vomiting  - S/p 1L IVF in ED  - Will give additional 1L bolus + maintenance IVF  -Suspect 2/2 dehydration for n/v/d    Problem/Plan - 5:  ·  Problem: DM (diabetes mellitus).   ·  Plan: - Home meds Invokana, metformin, glimepiride, Ozempic  - ISS + FS achs.    Problem/Plan - 6:  ·  Problem: History of right knee joint replacement.   ·  Plan: - Has R knee pain  - S/p recent surgery  - Pain med PRN  - C/w gabapentin, flexeril PRN  - Ortho on board = No concerns for any pathology of the R knee sp TKA.    Problem/Plan - 7:  ·  Problem: Prophylactic measure.   ·  Plan: - ASA 81mg BID  - PPI for vomiting and prophylaxis for ASA.    On 4/4/25 this case was reviewed with Dr. Baez, the patient is medically stable and optimized for discharge as per attending. All medications were reviewed and prescriptions were sent to mutually agreed upon pharmacy. Discharge plan reviewed with patient and/or family.   HPI:  Patient is a 55F, with PMHx of DM, R TKA with Dr Reddy 3/28/25, who comes in with 2 days of intractable nausea, vomiting, and diarrhea. She had at least 7 episodes of diarrhea in one day, and also vomiting. When she vomits she gets R chest pain and shortness of breath. She also complained of having chills last night, with headache, heart palpitation. She also endorses R knee pain since the surgery. Patient denies bloody stools or urine.    Only recent antibiotic use is recently during surgical prophylaxis.    Hospital course:  The patient presented with intractable nausea, vomiting, and diarrhea. Symptoms began improving during her hospital stay. A CTA of the chest/abdomen/pelvis was negative for pulmonary embolism and acute inflammatory or obstructive processes. Lactate was 1.4 mmol/L. Urine toxicology screen was negative. The patient did not meet SIRS criteria. Gastroenteritis, likely viral, was the presumed diagnosis. She was given a PPI. She was initially placed on a clear liquid diet and advanced to a regular diet, which she tolerated well. IV fluids were discontinued as the patient tolerated oral intake. Antibiotics were held as the patient improved, and ultimately were not needed. Gastrointestinal PCR, stool cultures, stool ova and parasites, and C. difficile toxin assay were ordered but not yet collected as the patient’s stools were formed during the hospital stay.    Patient had home PT prior to admission. She should resume home services. No indication for inpatient PT evaluation as patient was here for 2 days so unlikely to have significant function decline. Patient wishes to resume home PT services.     Problem/Plan - 1:  ·  Problem: Intractable nausea and vomiting.   ·  Plan: - P/w nausea, vomiting, and diarrhea for 2 days  - Not worsening. Instead, improving already  - CTA C/A/P = No evidence of pulmonary embolism. No evidence of acute inflammatory or obstructive process in the abdomen and pelvis.  - Lactate 1.4  - UTox negative  - SIRS criteria not met  - Likely gastroenteritis, viral  - Will give PPI for now  - Initially on clear liquid diet but advanced to regular and tolerated well without nausea or vomiting   - Maintenance IVF were discontinued as patient tolerated PO  - Hold off antibiotics for now, as patient is already improving. Can start if patient's diarrhea worsens. Pt agrees with the plan. Did not require antibiotics this hospital stay as symptoms resolved.  - F/u GI PCR, Stool cultures, Stool O&P, C diff(low suspicion but will send due to recent antibiotic use)-->not yet collected as patient is having formed stools.     Problem/Plan - 2:  ·  Problem: Intractable diarrhea.   ·  Plan: - As above.    Problem/Plan - 3:  ·  Problem: Gastroenteritis.   ·  Plan: - As above.    Problem/Plan - 4:  ·  Problem: Sinus tachycardia.   ·  Plan: - EKG = Sinus tachycardia Minimal voltage criteria for LVH, may be normal variant ( R in aVL )  - TSH Wnl  - Likely due to intractable vomiting and diarrhea. She said she was in the bathroom having diarrhea and had bag over her mouth for vomiting  - S/p 1L IVF in ED  - Will give additional 1L bolus + maintenance IVF  -Suspect 2/2 dehydration for n/v/d    Problem/Plan - 5:  ·  Problem: DM (diabetes mellitus).   ·  Plan: - Home meds Invokana, metformin, glimepiride, Ozempic  - ISS + FS achs.    Problem/Plan - 6:  ·  Problem: History of right knee joint replacement.   ·  Plan: - Has R knee pain  - S/p recent surgery  - Pain med PRN  - C/w gabapentin, flexeril PRN  - Ortho on board = No concerns for any pathology of the R knee sp TKA.    Problem/Plan - 7:  ·  Problem: Prophylactic measure.   ·  Plan: - ASA 81mg BID  - PPI for vomiting and prophylaxis for ASA.    On 4/4/25 this case was reviewed with Dr. Baez, the patient is medically stable and optimized for discharge as per attending. All medications were reviewed and prescriptions were sent to mutually agreed upon pharmacy. Discharge plan reviewed with patient and/or family.

## 2025-04-04 NOTE — DISCHARGE NOTE PROVIDER - NSDCFUADDINST_GEN_ALL_CORE_FT
Please see your primary care provider within the next 1-2 weeks for a post hospital follow up appointment

## 2025-04-04 NOTE — DISCHARGE NOTE PROVIDER - NSDCCPCAREPLAN_GEN_ALL_CORE_FT
PRINCIPAL DISCHARGE DIAGNOSIS  Diagnosis: Gastroenteritis  Assessment and Plan of Treatment: Gastroenteritis is an inflammation of the stomach and intestines that can cause nausea, vomiting, diarrhea, and abdominal pain. It is commonly caused by viruses, bacteria, or parasites. The main goal of treatment is to prevent dehydration and manage symptoms. Most people recover from gastroenteritis within a few days. It is important to continue to stay hydrated and eat bland foods until symptoms resolve.   Seek medical attention if you experience:   Severe dehydration (e.g., dry mouth, sunken eyes, decreased urine output)  Bloody stools  Severe abdominal pain  High fever (over 102°F)  Vomiting that lasts more than 24 hours         SECONDARY DISCHARGE DIAGNOSES  Diagnosis: DM (diabetes mellitus)  Assessment and Plan of Treatment: Your HgA1C during hospitalization was noted to be 7.8% (Provide such information to your primary care).  Continue your medication regimen and a consistent carbohydrate diet (Meaning eating the same amount of carbohydrates at the same time each day). Monitor blood glucose levels throughout the day before meals and at bedtime. Record blood sugars and bring to outpatient providers appointment in order to be reviewed by your doctor for management modifications. If your sugars are more than 400 or less than 70 you should contact your PCP immediately.   Monitor for signs/symptoms of low blood glucose, such as, dizziness, altered mental status, or cool/clammy skin. In addition, monitor for signs/symptoms of high blood glucose, such as, feeling hot, dry, fatigued, or with increased thirst/urination.   Make regular podiatry appointments in order to have feet checked for wounds and uncontrolled toe nail growth to prevent infections, as well as, appointments with an ophthalmologist to monitor your vision.      Diagnosis: Dehydration  Assessment and Plan of Treatment: You recieved IV hydration while admitted to the hospital for dehydration which has now resolved.    Diagnosis: Sinus tachycardia  Assessment and Plan of Treatment: You were found to have a rapid heart rate due to dehydration, which has now resolved with IV hydration.    Diagnosis: History of right knee joint replacement  Assessment and Plan of Treatment: Follow up with orthopedics as scheduled     PRINCIPAL DISCHARGE DIAGNOSIS  Diagnosis: Gastroenteritis  Assessment and Plan of Treatment: Gastroenteritis is an inflammation of the stomach and intestines that can cause nausea, vomiting, diarrhea, and abdominal pain. It is commonly caused by viruses, bacteria, or parasites. You likely experienced viral gastroenteritis. You had a CT scan of your abdomen that did not have any acute findings. The main goal of treatment is to prevent dehydration and manage symptoms. Most people recover from gastroenteritis within a few days. It is important to continue to stay hydrated and eat bland foods until symptoms resolve.   Seek medical attention if you experience:   Severe dehydration (e.g., dry mouth, sunken eyes, decreased urine output)  Bloody stools  Severe abdominal pain  High fever (over 102°F)  Vomiting that lasts more than 24 hours         SECONDARY DISCHARGE DIAGNOSES  Diagnosis: Dehydration  Assessment and Plan of Treatment: You recieved IV hydration while admitted to the hospital for dehydration which has now resolved.    Diagnosis: DM (diabetes mellitus)  Assessment and Plan of Treatment: Your HgA1C during hospitalization was noted to be 7.8% (Provide such information to your primary care).  Continue your medication regimen and a consistent carbohydrate diet (Meaning eating the same amount of carbohydrates at the same time each day). Monitor blood glucose levels throughout the day before meals and at bedtime. Record blood sugars and bring to outpatient providers appointment in order to be reviewed by your doctor for management modifications. If your sugars are more than 400 or less than 70 you should contact your PCP immediately.   Monitor for signs/symptoms of low blood glucose, such as, dizziness, altered mental status, or cool/clammy skin. In addition, monitor for signs/symptoms of high blood glucose, such as, feeling hot, dry, fatigued, or with increased thirst/urination.   Make regular podiatry appointments in order to have feet checked for wounds and uncontrolled toe nail growth to prevent infections, as well as, appointments with an ophthalmologist to monitor your vision.      Diagnosis: History of right knee joint replacement  Assessment and Plan of Treatment: Follow up with orthopedics as scheduled    Diagnosis: Sinus tachycardia  Assessment and Plan of Treatment: You were found to have a rapid heart rate due to dehydration, which has now resolved with IV hydration.

## 2025-04-04 NOTE — DISCHARGE NOTE PROVIDER - NSDCMRMEDTOKEN_GEN_ALL_CORE_FT
acetaminophen 325 mg oral tablet: 2 tab(s) orally every 6 hours As needed Mild Pain (1 - 3), Moderate Pain (4 - 6)  aspirin 81 mg oral delayed release tablet: 1 tab(s) orally 2 times a day for 30 days MDD: 2 tablets  celecoxib 200 mg oral capsule: 1 cap(s) orally every 12 hours as needed for Moderate Pain (4 - 6) MDD: 2  cyclobenzaprine 5 mg oral tablet: 1 tab(s) orally 3 times a day as needed for Muscle Spasm MDD: 3  gabapentin 300 mg oral capsule: 1 cap(s) orally every 12 hours  GLIMEPIRIDE 4 MG TABLET: TAKE 1 TABLET BY MOUTH EVERY DAY WITH BREAKFAST OR THE FIRST MAIN MEAL OF THE DAY FOR 90 DAYS  INVOKANA 300MG TAB: TAKE 1 TABLET BY MOUTH ONCE DAILY FOR 90 DAYS  METFORMIN HCL 1,000 MG TABLET: TAKE 1 TABLET BY MOUTH EVERY DAY WITH A MEAL FOR 90 DAYS  Multiple Vitamins oral tablet: 1 tab(s) orally once a day  ondansetron 4 mg oral tablet: 1 tab(s) orally every 6 hours MDD: 4 tablets  OZEMPIC 8MG/3ML     INJ: INJECT 2 MG SUBCUTANEOUSLY ONCE A WEEK  pantoprazole 40 mg oral delayed release tablet: 1 tab(s) orally once a day (before a meal)  senna leaf extract oral tablet: 2 tab(s) orally once a day (at bedtime)

## 2025-04-07 ENCOUNTER — NON-APPOINTMENT (OUTPATIENT)
Age: 55
End: 2025-04-07

## 2025-04-08 ENCOUNTER — APPOINTMENT (OUTPATIENT)
Dept: ORTHOPEDIC SURGERY | Facility: CLINIC | Age: 55
End: 2025-04-08
Payer: MEDICAID

## 2025-04-08 DIAGNOSIS — Z96.651 PRESENCE OF RIGHT ARTIFICIAL KNEE JOINT: ICD-10-CM

## 2025-04-08 DIAGNOSIS — Z00.00 ENCOUNTER FOR GENERAL ADULT MEDICAL EXAMINATION W/OUT ABNORMAL FINDINGS: ICD-10-CM

## 2025-04-08 PROCEDURE — 99024 POSTOP FOLLOW-UP VISIT: CPT

## 2025-04-08 PROCEDURE — 73562 X-RAY EXAM OF KNEE 3: CPT | Mod: RT

## 2025-04-08 RX ORDER — OXYCODONE 5 MG/1
5 TABLET ORAL EVERY 6 HOURS
Qty: 28 | Refills: 0 | Status: ACTIVE | COMMUNITY
Start: 2025-04-08 | End: 1900-01-01

## 2025-04-17 DIAGNOSIS — M25.561 PAIN IN RIGHT KNEE: ICD-10-CM

## 2025-04-17 DIAGNOSIS — Z11.52 ENCOUNTER FOR SCREENING FOR COVID-19: ICD-10-CM

## 2025-04-17 DIAGNOSIS — E83.39 OTHER DISORDERS OF PHOSPHORUS METABOLISM: ICD-10-CM

## 2025-04-17 DIAGNOSIS — R11.10 VOMITING, UNSPECIFIED: ICD-10-CM

## 2025-04-17 DIAGNOSIS — Z79.899 OTHER LONG TERM (CURRENT) DRUG THERAPY: ICD-10-CM

## 2025-04-17 DIAGNOSIS — Z96.651 PRESENCE OF RIGHT ARTIFICIAL KNEE JOINT: ICD-10-CM

## 2025-04-17 DIAGNOSIS — E11.9 TYPE 2 DIABETES MELLITUS WITHOUT COMPLICATIONS: ICD-10-CM

## 2025-04-17 DIAGNOSIS — Z79.82 LONG TERM (CURRENT) USE OF ASPIRIN: ICD-10-CM

## 2025-04-17 DIAGNOSIS — A08.4 VIRAL INTESTINAL INFECTION, UNSPECIFIED: ICD-10-CM

## 2025-04-17 DIAGNOSIS — E86.0 DEHYDRATION: ICD-10-CM

## 2025-04-17 DIAGNOSIS — Z79.85 LONG-TERM (CURRENT) USE OF INJECTABLE NON-INSULIN ANTIDIABETIC DRUGS: ICD-10-CM

## 2025-04-17 DIAGNOSIS — Z79.84 LONG TERM (CURRENT) USE OF ORAL HYPOGLYCEMIC DRUGS: ICD-10-CM

## 2025-05-02 ENCOUNTER — NON-APPOINTMENT (OUTPATIENT)
Age: 55
End: 2025-05-02

## 2025-05-06 ENCOUNTER — APPOINTMENT (OUTPATIENT)
Dept: ORTHOPEDIC SURGERY | Facility: CLINIC | Age: 55
End: 2025-05-06
Payer: MEDICAID

## 2025-05-06 DIAGNOSIS — Z96.651 PRESENCE OF RIGHT ARTIFICIAL KNEE JOINT: ICD-10-CM

## 2025-05-06 PROCEDURE — 99024 POSTOP FOLLOW-UP VISIT: CPT

## 2025-05-06 RX ORDER — OXYCODONE 5 MG/1
5 TABLET ORAL EVERY 6 HOURS
Qty: 28 | Refills: 0 | Status: ACTIVE | COMMUNITY
Start: 2025-05-06 | End: 1900-01-01

## 2025-06-11 ENCOUNTER — NON-APPOINTMENT (OUTPATIENT)
Age: 55
End: 2025-06-11

## 2025-06-17 ENCOUNTER — APPOINTMENT (OUTPATIENT)
Dept: ORTHOPEDIC SURGERY | Facility: CLINIC | Age: 55
End: 2025-06-17
Payer: MEDICAID

## 2025-06-17 PROCEDURE — 99024 POSTOP FOLLOW-UP VISIT: CPT

## 2025-07-29 ENCOUNTER — APPOINTMENT (OUTPATIENT)
Dept: ORTHOPEDIC SURGERY | Facility: CLINIC | Age: 55
End: 2025-07-29
Payer: MEDICAID

## 2025-07-29 DIAGNOSIS — Z96.651 PRESENCE OF RIGHT ARTIFICIAL KNEE JOINT: ICD-10-CM

## 2025-07-29 PROCEDURE — 99213 OFFICE O/P EST LOW 20 MIN: CPT

## 2025-07-29 PROCEDURE — 73562 X-RAY EXAM OF KNEE 3: CPT | Mod: RT

## 2025-07-29 RX ORDER — CELECOXIB 200 MG/1
200 CAPSULE ORAL
Qty: 60 | Refills: 0 | Status: ACTIVE | COMMUNITY
Start: 2025-07-29 | End: 1900-01-01

## (undated) DEVICE — ELCTR STRYKER NEPTUNE SMOKE EVACUATION PENCIL (GREEN)

## (undated) DEVICE — SYR LUER LOK 50CC

## (undated) DEVICE — MAKO BLADE NARROW

## (undated) DEVICE — MAKO VIZADISC KNEE TRACKING KIT

## (undated) DEVICE — DRSG ACE BANDAGE 6"

## (undated) DEVICE — BLADE SURGICAL #11 CARBON

## (undated) DEVICE — SAW BLADE BRASSLER 0.64MM THK 55X29MM LG

## (undated) DEVICE — MAKO DRAPE KIT

## (undated) DEVICE — FRA-ESU BOVIE FORCE TRIAD T7J19731DX: Type: DURABLE MEDICAL EQUIPMENT

## (undated) DEVICE — PREP BETADINE POUCH 0.75OZ

## (undated) DEVICE — CRYO/CUFF GRAVITY COOLER KNEE LARGE

## (undated) DEVICE — DRSG PICO NPWT 4X12"

## (undated) DEVICE — ZIMMER PULSAVAC PLUS FAN KIT

## (undated) DEVICE — SUT VICRYL 0 36" CT-1 UNDYED

## (undated) DEVICE — SUT STRATAFIX SPIRAL PDS PLUS 2-0 45CM CT-1

## (undated) DEVICE — SUT STRATAFIX SPIRAL MONOCRYL PLUS 4-0 45CM PS-2 UNDYED

## (undated) DEVICE — GLV 7 PROTEXIS ORTHO (BROWN)

## (undated) DEVICE — NDL HYPO SAFE 18G X 1.5" (PINK)

## (undated) DEVICE — PREP BETADINE KIT

## (undated) DEVICE — GLV 7 PROTEXIS (BLUE)

## (undated) DEVICE — PACK TOTAL KNEE

## (undated) DEVICE — SUT STRATAFIX SPIRAL MONOCRYL PLUS 4-0 70CM PS-2 UNDYED

## (undated) DEVICE — NDL HYPO SAFE 20G X 1.5" (YELLOW)

## (undated) DEVICE — HOOD FLYTE STRYKER HELMET SHIELD

## (undated) DEVICE — SOL IRR BAG NS 0.9% 3000ML

## (undated) DEVICE — MAKO STRYKER SILICONE RETRACTOR CORD

## (undated) DEVICE — MAKO BLADE STANDARD

## (undated) DEVICE — SUCTION YANKAUER NO CONTROL VENT

## (undated) DEVICE — STRYKER MIXEVAC 3 BONE CEMENT MIXER

## (undated) DEVICE — GLV 7 PROTEXIS (WHITE)

## (undated) DEVICE — DRSG COBAN 6"

## (undated) DEVICE — MEDICATION LABELS W MARKER

## (undated) DEVICE — MYOSURE SCOPE SEAL

## (undated) DEVICE — DRSG 4 X 4" 4PLY STERILE

## (undated) DEVICE — HOOD T5 PEELAWAY

## (undated) DEVICE — POSITIONER FOAM BUMP FLAT TOP 10X6X4" LRG

## (undated) DEVICE — SUT PDO 2 1/2 CIRCLE 40MM NDL 45CM